# Patient Record
Sex: FEMALE | Race: WHITE
[De-identification: names, ages, dates, MRNs, and addresses within clinical notes are randomized per-mention and may not be internally consistent; named-entity substitution may affect disease eponyms.]

---

## 2017-06-26 ENCOUNTER — HOSPITAL ENCOUNTER (OUTPATIENT)
Dept: HOSPITAL 47 - RADECHMAIN | Age: 82
Discharge: HOME | End: 2017-06-26
Attending: INTERNAL MEDICINE
Payer: MEDICARE

## 2017-06-26 DIAGNOSIS — R00.1: ICD-10-CM

## 2017-06-26 DIAGNOSIS — I44.0: Primary | ICD-10-CM

## 2017-06-26 DIAGNOSIS — R00.0: ICD-10-CM

## 2017-06-26 PROCEDURE — 93225 XTRNL ECG REC<48 HRS REC: CPT

## 2017-06-26 PROCEDURE — 93226 XTRNL ECG REC<48 HR SCAN A/R: CPT

## 2017-09-09 ENCOUNTER — HOSPITAL ENCOUNTER (INPATIENT)
Dept: HOSPITAL 47 - EC | Age: 82
LOS: 2 days | Discharge: HOME | DRG: 312 | End: 2017-09-11
Payer: MEDICARE

## 2017-09-09 VITALS — BODY MASS INDEX: 24.4 KG/M2

## 2017-09-09 DIAGNOSIS — Z80.3: ICD-10-CM

## 2017-09-09 DIAGNOSIS — Z92.21: ICD-10-CM

## 2017-09-09 DIAGNOSIS — I45.10: ICD-10-CM

## 2017-09-09 DIAGNOSIS — E03.9: ICD-10-CM

## 2017-09-09 DIAGNOSIS — Z90.12: ICD-10-CM

## 2017-09-09 DIAGNOSIS — N18.2: ICD-10-CM

## 2017-09-09 DIAGNOSIS — Z79.899: ICD-10-CM

## 2017-09-09 DIAGNOSIS — I67.1: ICD-10-CM

## 2017-09-09 DIAGNOSIS — I95.1: Primary | ICD-10-CM

## 2017-09-09 DIAGNOSIS — Z85.3: ICD-10-CM

## 2017-09-09 DIAGNOSIS — F03.90: ICD-10-CM

## 2017-09-09 DIAGNOSIS — Z80.1: ICD-10-CM

## 2017-09-09 DIAGNOSIS — Z88.6: ICD-10-CM

## 2017-09-09 DIAGNOSIS — Z79.82: ICD-10-CM

## 2017-09-09 DIAGNOSIS — N17.9: ICD-10-CM

## 2017-09-09 DIAGNOSIS — Z90.710: ICD-10-CM

## 2017-09-09 DIAGNOSIS — Z80.0: ICD-10-CM

## 2017-09-09 LAB
ALP SERPL-CCNC: 62 U/L (ref 38–126)
ALT SERPL-CCNC: 41 U/L (ref 9–52)
ANION GAP SERPL CALC-SCNC: 11 MMOL/L
AST SERPL-CCNC: 24 U/L (ref 14–36)
BASOPHILS # BLD AUTO: 0.1 K/UL (ref 0–0.2)
BASOPHILS NFR BLD AUTO: 1 %
BUN SERPL-SCNC: 27 MG/DL (ref 7–17)
CALCIUM SPEC-MCNC: 9.9 MG/DL (ref 8.4–10.2)
CH: 30.5
CHCM: 35.2
CHLORIDE SERPL-SCNC: 102 MMOL/L (ref 98–107)
CK SERPL-CCNC: 30 U/L (ref 30–135)
CO2 SERPL-SCNC: 22 MMOL/L (ref 22–30)
EOSINOPHIL # BLD AUTO: 0.5 K/UL (ref 0–0.7)
EOSINOPHIL NFR BLD AUTO: 7 %
ERYTHROCYTE [DISTWIDTH] IN BLOOD BY AUTOMATED COUNT: 4.69 M/UL (ref 3.8–5.4)
ERYTHROCYTE [DISTWIDTH] IN BLOOD: 12.9 % (ref 11.5–15.5)
GLUCOSE BLD-MCNC: 113 MG/DL (ref 75–99)
GLUCOSE SERPL-MCNC: 115 MG/DL (ref 74–99)
HCT VFR BLD AUTO: 40.8 % (ref 34–46)
HDW: 2.78
HGB BLD-MCNC: 14.4 GM/DL (ref 11.4–16)
LUC NFR BLD AUTO: 4 %
LYMPHOCYTES # SPEC AUTO: 2.8 K/UL (ref 1–4.8)
LYMPHOCYTES NFR SPEC AUTO: 38 %
MAGNESIUM SPEC-SCNC: 2.2 MG/DL (ref 1.6–2.3)
MCH RBC QN AUTO: 30.6 PG (ref 25–35)
MCHC RBC AUTO-ENTMCNC: 35.2 G/DL (ref 31–37)
MCV RBC AUTO: 87 FL (ref 80–100)
MONOCYTES # BLD AUTO: 0.6 K/UL (ref 0–1)
MONOCYTES NFR BLD AUTO: 8 %
NEUTROPHILS # BLD AUTO: 3 K/UL (ref 1.3–7.7)
NEUTROPHILS NFR BLD AUTO: 41 %
NON-AFRICAN AMERICAN GFR(MDRD): 35
PARTICLE COUNT: 8498
PH UR: 5.5 [PH] (ref 5–8)
POTASSIUM SERPL-SCNC: 4.6 MMOL/L (ref 3.5–5.1)
PROT SERPL-MCNC: 7.2 G/DL (ref 6.3–8.2)
RBC UR QL: <1 /HPF (ref 0–5)
SODIUM SERPL-SCNC: 135 MMOL/L (ref 137–145)
SP GR UR: 1.01 (ref 1–1.03)
SQUAMOUS UR QL AUTO: <1 /HPF (ref 0–4)
TROPONIN I SERPL-MCNC: <0.012 NG/ML (ref 0–0.03)
UA BILLING (MACRO VS. MICRO): (no result)
UROBILINOGEN UR QL STRIP: <2 MG/DL (ref ?–2)
WBC # BLD AUTO: 0.3 10*3/UL
WBC # BLD AUTO: 7.3 K/UL (ref 3.8–10.6)
WBC #/AREA URNS HPF: 2 /HPF (ref 0–5)
WBC (PEROX): 7.15

## 2017-09-09 PROCEDURE — 71020: CPT

## 2017-09-09 PROCEDURE — 87077 CULTURE AEROBIC IDENTIFY: CPT

## 2017-09-09 PROCEDURE — 84443 ASSAY THYROID STIM HORMONE: CPT

## 2017-09-09 PROCEDURE — 99285 EMERGENCY DEPT VISIT HI MDM: CPT

## 2017-09-09 PROCEDURE — 81003 URINALYSIS AUTO W/O SCOPE: CPT

## 2017-09-09 PROCEDURE — 84439 ASSAY OF FREE THYROXINE: CPT

## 2017-09-09 PROCEDURE — 82553 CREATINE MB FRACTION: CPT

## 2017-09-09 PROCEDURE — 93306 TTE W/DOPPLER COMPLETE: CPT

## 2017-09-09 PROCEDURE — 70450 CT HEAD/BRAIN W/O DYE: CPT

## 2017-09-09 PROCEDURE — 85025 COMPLETE CBC W/AUTO DIFF WBC: CPT

## 2017-09-09 PROCEDURE — 80048 BASIC METABOLIC PNL TOTAL CA: CPT

## 2017-09-09 PROCEDURE — 84484 ASSAY OF TROPONIN QUANT: CPT

## 2017-09-09 PROCEDURE — 83735 ASSAY OF MAGNESIUM: CPT

## 2017-09-09 PROCEDURE — 81001 URINALYSIS AUTO W/SCOPE: CPT

## 2017-09-09 PROCEDURE — 96360 HYDRATION IV INFUSION INIT: CPT

## 2017-09-09 PROCEDURE — 70498 CT ANGIOGRAPHY NECK: CPT

## 2017-09-09 PROCEDURE — 82550 ASSAY OF CK (CPK): CPT

## 2017-09-09 PROCEDURE — 36415 COLL VENOUS BLD VENIPUNCTURE: CPT

## 2017-09-09 PROCEDURE — 87086 URINE CULTURE/COLONY COUNT: CPT

## 2017-09-09 PROCEDURE — 94760 N-INVAS EAR/PLS OXIMETRY 1: CPT

## 2017-09-09 PROCEDURE — 80053 COMPREHEN METABOLIC PANEL: CPT

## 2017-09-09 PROCEDURE — 93005 ELECTROCARDIOGRAM TRACING: CPT

## 2017-09-09 PROCEDURE — 96361 HYDRATE IV INFUSION ADD-ON: CPT

## 2017-09-09 PROCEDURE — 87186 SC STD MICRODIL/AGAR DIL: CPT

## 2017-09-09 NOTE — CT
EXAMINATION TYPE: CT brain wo con

 

DATE OF EXAM: 9/9/2017

 

COMPARISON: Prior head CT 2/23/2013

 

HISTORY: Dizziness and syncope.

 

CT DLP: 949.40 mGycm

Automated exposure control for dose reduction was used. Helical imaging through the brain.

 

FINDINGS: 

Exam is stable. There is extensive cortical atrophy, cerebral vascular calcifications are present. Pe
riventricular white matter shows patchy low attenuation. There is no hemorrhage or hydrocephalus. The
 calvarium is intact. 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

 

IMPRESSION: 

NO ACUTE ABNORMALITY.

## 2017-09-09 NOTE — XR
EXAMINATION TYPE: XR chest 2V

 

DATE OF EXAM: 9/9/2017

 

COMPARISON: Prior chest x-ray 7/18/2013

 

HISTORY: Cough and weakness

 

TECHNIQUE:  Frontal and lateral views of the chest are obtained.

 

FINDINGS: Technique is apical lordotic and rotated. There is no focal air space opacity, pleural effu
tj, or pneumothorax seen.  The cardiac silhouette size is thought to be stable, rotation may accent
uate the appearance.   The osseous structures are intact.

 

IMPRESSION:  No acute cardiopulmonary process. Difficult to exclude cardiomegaly.

## 2017-09-09 NOTE — CT
EXAM:

  CT Angiography Neck With Intravenous Contrast

 

CLINICAL HISTORY:

  Reason: syncope

 

TECHNIQUE:

  Axial computed tomographic angiography images of the neck with 

intravenous contrast using CT angiography protocol.  This CT exam was 

performed using one or more of the following dose reduction techniques: 

automated exposure control, adjustment of the mA and/or kV according to 

patient size, and/or use of iterative reconstruction technique.

  MIP reconstructed images were created and reviewed.  Dose Length 

Product (DLP) is 228.90 mGy-cm.

 

CONTRAST:

  80 mL of Visipaque 320 administered intravenously.

 

COMPARISON:

  No relevant prior studies available.

 

FINDINGS:

 

 VASCULATURE:

  Right common carotid artery:  Unremarkable.  No significant stenosis.  

No dissection or occlusion.

  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 

the right internal carotid artery located in the petrous segment.  There 

is osseous remodeling of the adjacent right temporal bone.  Large 

atherosclerotic plaques are seen around the aneurysm sac.  Extracranial 

segment is patent with no significant stenosis.  No dissection or 

occlusion.

  Right external carotid artery:  Unremarkable.  No occlusion.

  Right vertebral artery:  Unremarkable.  No significant stenosis.  No 

dissection or occlusion.

 

  Left common carotid artery:  Unremarkable.  No significant stenosis.  

No dissection or occlusion.

  Left internal carotid artery:  Unremarkable.  Extracranial segment is 

patent with no significant stenosis.  No dissection or occlusion.

  Left external carotid artery:  Unremarkable.  No occlusion.

  Left vertebral artery:  Unremarkable.  No significant stenosis.  No 

dissection or occlusion.

 

 NECK:

  Bones/joints:  No acute fracture.  No dislocation.

  Soft tissues:  Unremarkable as visualized.  No mass.

 

 CAROTID STENOSIS REFERENCE USING NASCET CRITERIA:

  % ICA stenosis = (1 - narrowest ICA diameter/diameter of distal 

cervical ICA) x 100.

  Mild - <50% stenosis.

  Moderate - 50-69% stenosis.

  Severe - 70-94% stenosis.

  Near occlusion - 95-99% stenosis.

  Occluded - 100% stenosis.

 

IMPRESSION:     

  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 

artery located in the petrous segment.

## 2017-09-09 NOTE — ED
Syncope HPI





- General


Source: patient, family, RN notes reviewed


Mode of arrival: wheelchair


Limitations: no limitations





- History of Present Illness


MD Complaint: loss of consciousness, felt faint





<Mayur Lindsay - Last Filed: 09/09/17 20:47>





<Brodie Ordonez - Last Filed: 09/09/17 23:02>





- General


Chief Complaint: Dizziness


Stated Complaint: syncope,


Time Seen by Provider: 09/09/17 18:42





- History of Present Illness


Initial Comments: 





This is a 85-year-old female with a history of prior episodes of passing out 

also history of a mastectomy about 5 years ago history of chemo or radiation 

with that who was at [facility].  She got out of the bathroom a.m. possibly 

passed out.  She has no recall of this.  She does not recall any headache 

dizziness blurry vision palpitations or other symptoms she was brought in by 

private vehicle by her  patient is had no symptoms since no complaints 

of focal weakness headache blurry vision.  She did take her medication today 

and an empty stomach.  It is unclear per  of this had anything to do 

with it. (Mayur Lindsay)





- Related Data


 Home Medications











 Medication  Instructions  Recorded  Confirmed


 


Aspirin EC [Ecotrin Low Dose] 81 mg PO DAILY 09/09/17 09/09/17


 


Cyanocobalamin [Vitamin B-12 1,000 mcg SQ WE 09/09/17 09/09/17





Injection]   


 


Cyanocobalamin [Vitamin B-12] 500 mcg PO DAILY 09/09/17 09/09/17


 


Levothyroxine Sodium [Synthroid] 125 mcg PO DAILY 09/09/17 09/09/17


 


Multivitamins, Thera [Multivitamin 1 tab PO DAILY 09/09/17 09/09/17





(formulary)]   











 Allergies











Allergy/AdvReac Type Severity Reaction Status Date / Time


 


meperidine [From Demerol] Allergy  Rash/Hives Verified 09/09/17 19:09














Review of Systems


ROS Other: All systems not noted in ROS Statement are negative.





<Mayur Lindsay - Last Filed: 09/09/17 20:47>


ROS Other: All systems not noted in ROS Statement are negative.





<Brodie Ordonez - Last Filed: 09/09/17 23:02>


ROS Statement: 


Those systems with pertinent positive or pertinent negative responses have been 

documented in the HPI.








Past Medical History


Past Medical History: Thyroid Disorder


History of Any Multi-Drug Resistant Organisms: None Reported


Past Surgical History: Hysterectomy


Additional Past Surgical History / Comment(s): left masectomy


Past Psychological History: No Psychological Hx Reported


Smoking Status: Never smoker


Past Alcohol Use History: None Reported


Past Drug Use History: None Reported





<Mayur Lindsay - Last Filed: 09/09/17 20:47>





General Exam


Limitations: no limitations


General appearance: alert, in no apparent distress


Head exam: Present: atraumatic, normocephalic, normal inspection


Eye exam: Present: normal appearance, PERRL, EOMI.  Absent: scleral icterus, 

conjunctival injection, periorbital swelling


ENT exam: Present: mucous membranes dry


Neck exam: Present: normal inspection.  Absent: tenderness, meningismus, 

lymphadenopathy


Respiratory exam: Present: normal lung sounds bilaterally.  Absent: respiratory 

distress, wheezes, rales, rhonchi, stridor


Cardiovascular Exam: Present: regular rate, normal rhythm, normal heart sounds.

  Absent: systolic murmur, diastolic murmur, rubs, gallop, clicks


GI/Abdominal exam: Present: soft, normal bowel sounds.  Absent: distended, 

tenderness, guarding, rebound, rigid


Extremities exam: Present: normal inspection, full ROM, normal capillary 

refill.  Absent: tenderness, pedal edema, joint swelling, calf tenderness


Back exam: Present: normal inspection


Neurological exam: Present: alert, oriented X3, CN II-XII intact


Psychiatric exam: Present: normal affect, normal mood


Skin exam: Present: warm, dry, intact, normal color.  Absent: rash





<Mayur Lindsay - Last Filed: 09/09/17 20:47>





<Brodie Ordonez - Last Filed: 09/09/17 23:02>





- General Exam Comments


Initial Comments: 





This is a well up well-nourished awake alert oriented 3 female (Mayur Lindsay)





Course





<Mayur Lindsay - Last Filed: 09/09/17 20:47>





<Brodie Ordonez - Last Filed: 09/09/17 23:02>





 Vital Signs











  09/09/17 09/09/17 09/09/17





  18:37 19:40 20:41


 


Temperature 98.2 F  


 


Pulse Rate 69  71


 


Pulse Rate [  62 





Sitting]   


 


Pulse Rate [  68 





Supine Cardiac   





Monitor]   


 


Respiratory 16  18





Rate   


 


Blood Pressure 131/64  152/55


 


Blood Pressure  114/63 





[Right Arm   





Sitting]   


 


Blood Pressure  119/58 





[Right Arm   





Supine]   


 


O2 Sat by Pulse 97  96





Oximetry   














  09/09/17





  22:51


 


Temperature 


 


Pulse Rate 66


 


Pulse Rate [ 





Sitting] 


 


Pulse Rate [ 





Supine Cardiac 





Monitor] 


 


Respiratory 16





Rate 


 


Blood Pressure 142/77


 


Blood Pressure 





[Right Arm 





Sitting] 


 


Blood Pressure 





[Right Arm 





Supine] 


 


O2 Sat by Pulse 96





Oximetry 














- Reevaluation(s)


Reevaluation #1: 





09/09/17 20:47


The patient will be endorsed to Dr. Ordonez who will make about disposition (Mayur Lindsay)





EKG Findings





- EKG Results:


EKG: interpreted by ERMD, sinus rhythm (Sinus rhythm a rate of 67 RI interval 

156  QT since QTC of 426/450 with exodeviation right bundle-branch block 

no acute ST-T wave changes.)





<Mayur Lindsay - Last Filed: 09/09/17 20:47>





Medical Decision Making





- Lab Data


Result diagrams: 


 09/09/17 19:00





 09/09/17 19:00





<Mayur Lindsay - Last Filed: 09/09/17 20:47>





- Lab Data


Result diagrams: 


 09/09/17 19:00





 09/09/17 19:00





- Radiology Data


Radiology results: report reviewed (Computed tomography scan of the brain shows 

no acute process.), image reviewed (Chest x-ray shows no acute process)





<Brodie Ordonez - Last Filed: 09/09/17 23:02>





- Medical Decision Making





Patient reevaluated and resting comfortably in bed.  Patient symptom-free at 

this time.  Patient was at [employer] and got up to use the restroom.  Patient then 

passed out.  Some onset.  Patient does not recall the episode and does not have 

symptoms prior to this.  Case was discussed in detail with Dr. Dunne, who will 

admit for Dr. Carter.  She recommends cardiology consult, no neurology consult 

at this time however would like EEG and CT of the neck.  Patient and family 

were updated. (Brodie Ordonez)





- Lab Data





 Lab Results











  09/09/17 09/09/17 09/09/17 Range/Units





  19:00 19:00 19:00 


 


WBC  7.3    (3.8-10.6)  k/uL


 


RBC  4.69    (3.80-5.40)  m/uL


 


Hgb  14.4    (11.4-16.0)  gm/dL


 


Hct  40.8    (34.0-46.0)  %


 


MCV  87.0    (80.0-100.0)  fL


 


MCH  30.6    (25.0-35.0)  pg


 


MCHC  35.2    (31.0-37.0)  g/dL


 


RDW  12.9    (11.5-15.5)  %


 


Plt Count  306    (150-450)  k/uL


 


Neutrophils %  41    %


 


Lymphocytes %  38    %


 


Monocytes %  8    %


 


Eosinophils %  7    %


 


Basophils %  1    %


 


Neutrophils #  3.0    (1.3-7.7)  k/uL


 


Lymphocytes #  2.8    (1.0-4.8)  k/uL


 


Monocytes #  0.6    (0-1.0)  k/uL


 


Eosinophils #  0.5    (0-0.7)  k/uL


 


Basophils #  0.1    (0-0.2)  k/uL


 


Sodium   135 L   (137-145)  mmol/L


 


Potassium   4.6   (3.5-5.1)  mmol/L


 


Chloride   102   ()  mmol/L


 


Carbon Dioxide   22   (22-30)  mmol/L


 


Anion Gap   11   mmol/L


 


BUN   27 H   (7-17)  mg/dL


 


Creatinine   1.44 H   (0.52-1.04)  mg/dL


 


Est GFR (MDRD) Af Amer   42   (>60 ml/min/1.73 sqM)  


 


Est GFR (MDRD) Non-Af   35   (>60 ml/min/1.73 sqM)  


 


Glucose   115 H   (74-99)  mg/dL


 


POC Glucose (mg/dL)     (75-99)  mg/dL


 


POC Glu Operater ID     


 


Calcium   9.9   (8.4-10.2)  mg/dL


 


Magnesium   2.2   (1.6-2.3)  mg/dL


 


Total Bilirubin   0.4   (0.2-1.3)  mg/dL


 


AST   24   (14-36)  U/L


 


ALT   41   (9-52)  U/L


 


Alkaline Phosphatase   62   ()  U/L


 


Total Creatine Kinase    30  ()  U/L


 


CK-MB (CK-2)    0.3  (0.0-2.4)  ng/mL


 


CK-MB (CK-2) Rel Index    1.0  


 


Troponin I    <0.012  (0.000-0.034)  ng/mL


 


Total Protein   7.2   (6.3-8.2)  g/dL


 


Albumin   4.0   (3.5-5.0)  g/dL


 


Urine Color     


 


Urine Appearance     (Clear)  


 


Urine pH     (5.0-8.0)  


 


Ur Specific Gravity     (1.001-1.035)  


 


Urine Protein     (Negative)  


 


Urine Glucose (UA)     (Negative)  


 


Urine Ketones     (Negative)  


 


Urine Blood     (Negative)  


 


Urine Nitrite     (Negative)  


 


Urine Bilirubin     (Negative)  


 


Urine Urobilinogen     (<2.0)  mg/dL


 


Ur Leukocyte Esterase     (Negative)  


 


Urine RBC     (0-5)  /hpf


 


Urine WBC     (0-5)  /hpf


 


Ur Squamous Epith Cells     (0-4)  /hpf


 


Urine Bacteria     (None)  /hpf


 


Hyaline Casts     (0-2)  /lpf


 


Urine Mucus     (None)  /hpf














  09/09/17 09/09/17 Range/Units





  19:03 21:10 


 


WBC    (3.8-10.6)  k/uL


 


RBC    (3.80-5.40)  m/uL


 


Hgb    (11.4-16.0)  gm/dL


 


Hct    (34.0-46.0)  %


 


MCV    (80.0-100.0)  fL


 


MCH    (25.0-35.0)  pg


 


MCHC    (31.0-37.0)  g/dL


 


RDW    (11.5-15.5)  %


 


Plt Count    (150-450)  k/uL


 


Neutrophils %    %


 


Lymphocytes %    %


 


Monocytes %    %


 


Eosinophils %    %


 


Basophils %    %


 


Neutrophils #    (1.3-7.7)  k/uL


 


Lymphocytes #    (1.0-4.8)  k/uL


 


Monocytes #    (0-1.0)  k/uL


 


Eosinophils #    (0-0.7)  k/uL


 


Basophils #    (0-0.2)  k/uL


 


Sodium    (137-145)  mmol/L


 


Potassium    (3.5-5.1)  mmol/L


 


Chloride    ()  mmol/L


 


Carbon Dioxide    (22-30)  mmol/L


 


Anion Gap    mmol/L


 


BUN    (7-17)  mg/dL


 


Creatinine    (0.52-1.04)  mg/dL


 


Est GFR (MDRD) Af Amer    (>60 ml/min/1.73 sqM)  


 


Est GFR (MDRD) Non-Af    (>60 ml/min/1.73 sqM)  


 


Glucose    (74-99)  mg/dL


 


POC Glucose (mg/dL)  113 H   (75-99)  mg/dL


 


POC Glu Operater ID  McDaid, Salome   


 


Calcium    (8.4-10.2)  mg/dL


 


Magnesium    (1.6-2.3)  mg/dL


 


Total Bilirubin    (0.2-1.3)  mg/dL


 


AST    (14-36)  U/L


 


ALT    (9-52)  U/L


 


Alkaline Phosphatase    ()  U/L


 


Total Creatine Kinase    ()  U/L


 


CK-MB (CK-2)    (0.0-2.4)  ng/mL


 


CK-MB (CK-2) Rel Index    


 


Troponin I    (0.000-0.034)  ng/mL


 


Total Protein    (6.3-8.2)  g/dL


 


Albumin    (3.5-5.0)  g/dL


 


Urine Color   Yellow  


 


Urine Appearance   Clear  (Clear)  


 


Urine pH   5.5  (5.0-8.0)  


 


Ur Specific Gravity   1.014  (1.001-1.035)  


 


Urine Protein   Negative  (Negative)  


 


Urine Glucose (UA)   Negative  (Negative)  


 


Urine Ketones   Trace H  (Negative)  


 


Urine Blood   Negative  (Negative)  


 


Urine Nitrite   Negative  (Negative)  


 


Urine Bilirubin   Negative  (Negative)  


 


Urine Urobilinogen   <2.0  (<2.0)  mg/dL


 


Ur Leukocyte Esterase   Trace H  (Negative)  


 


Urine RBC   <1  (0-5)  /hpf


 


Urine WBC   2  (0-5)  /hpf


 


Ur Squamous Epith Cells   <1  (0-4)  /hpf


 


Urine Bacteria   Occasional H  (None)  /hpf


 


Hyaline Casts   4 H  (0-2)  /lpf


 


Urine Mucus   Rare H  (None)  /hpf














Disposition





<Mayur Lindsay - Last Filed: 09/09/17 20:47>





<Brodie Ordonez - Last Filed: 09/09/17 23:02>


Clinical Impression: 


 Syncope





Disposition: ADMITTED AS IP TO THIS HOSP


Referrals: 


Jarek Carter MD [Primary Care Provider] - 1-2 days

## 2017-09-10 VITALS — RESPIRATION RATE: 18 BRPM

## 2017-09-10 LAB
ANION GAP SERPL CALC-SCNC: 7 MMOL/L
BUN SERPL-SCNC: 20 MG/DL (ref 7–17)
CALCIUM SPEC-MCNC: 9.2 MG/DL (ref 8.4–10.2)
CHLORIDE SERPL-SCNC: 111 MMOL/L (ref 98–107)
CK SERPL-CCNC: 23 U/L (ref 30–135)
CK SERPL-CCNC: 23 U/L (ref 30–135)
CO2 SERPL-SCNC: 17 MMOL/L (ref 22–30)
GLUCOSE SERPL-MCNC: 93 MG/DL (ref 74–99)
GLUCOSE UR QL: (no result)
NON-AFRICAN AMERICAN GFR(MDRD): 53
PH UR: 5.5 [PH] (ref 5–8)
POTASSIUM SERPL-SCNC: 4.3 MMOL/L (ref 3.5–5.1)
SODIUM SERPL-SCNC: 135 MMOL/L (ref 137–145)
SP GR UR: 1.01 (ref 1–1.03)
TROPONIN I SERPL-MCNC: <0.012 NG/ML (ref 0–0.03)
TROPONIN I SERPL-MCNC: <0.012 NG/ML (ref 0–0.03)
UA BILLING (MACRO VS. MICRO): (no result)
UROBILINOGEN UR QL STRIP: <2 MG/DL (ref ?–2)

## 2017-09-10 RX ADMIN — CEFAZOLIN SCH: 330 INJECTION, POWDER, FOR SOLUTION INTRAMUSCULAR; INTRAVENOUS at 00:58

## 2017-09-10 NOTE — P.CRDCN
History of Present Illness


Consult date: 09/10/17


Consult reason: sycope


History of present illness: 


85-year-old lady with history of mild dementia comes to Hospital having had an 

episode of syncope.  Patient was at [facility] yesterday she went to the bathroom 

and was brought back probably having had a syncope  is not quite sure 

not raised the the patient.  There is no bladder bowel incontinence she doesn't 

have chest pain she doesn't have focal neurological deficits EKG shows sinus 

rhythm with right bundle branch block.  Since being admitted she is doing well 

and did not have documented tachycardia or bradycardia arrhythmias.








Review of Systems


Constitutional: Denies chills. Denies fever.


Eyes: Denies blurred vision. Denies pain.


Ears, nose, mouth and throat: Denies headache. Denies sore throat.


Cardiovascular: Denies chest pain. Denies shortness of breath.


Respiratory: Denies cough.


Gastrointestinal: Denies abdominal pain. Denies diarrhea. Denies nausea. Denies 

vomiting.


Musculoskeletal: Denies myalgias.


Integumentary: Denies pruritus. Denies rash.


Neurological: Denies numbness. Denies weakness.  Syncope impaired memory


Psychiatric: Denies anxiety. Denies depression.


Endocrine: Denies fatigue. Denies weight change.


Genitourinary: Denies burning, hematuria, frequency of urination. 


Hematological: No anemia or excess bleeding. 








Past Medical History


Past Medical History: Cancer, Thyroid Disorder


Additional Past Medical History / Comment(s): Breast


History of Any Multi-Drug Resistant Organisms: None Reported


Past Surgical History: Hysterectomy


Additional Past Surgical History / Comment(s): left masectomy


Past Anesthesia/Blood Transfusion Reactions: No Reported Reaction


Past Psychological History: No Psychological Hx Reported


Smoking Status: Never smoker


Past Alcohol Use History: None Reported


Past Drug Use History: None Reported





- Past Family History


  ** Father


Family Medical History: Cancer


Additional Family Medical History / Comment(s): Lung





  ** Mother


Family Medical History: Cancer


Additional Family Medical History / Comment(s): Colon metastasize to liver





  ** Sister(s)


Family Medical History: Cancer


Additional Family Medical History / Comment(s): Breast





Medications and Allergies


 Home Medications











 Medication  Instructions  Recorded  Confirmed  Type


 


Aspirin EC [Ecotrin Low Dose] 81 mg PO DAILY 09/09/17 09/09/17 History


 


Cyanocobalamin [Vitamin B-12 1,000 mcg SQ WE 09/09/17 09/09/17 History





Injection]    


 


Cyanocobalamin [Vitamin B-12] 500 mcg PO DAILY 09/09/17 09/09/17 History


 


Levothyroxine Sodium [Synthroid] 125 mcg PO DAILY 09/09/17 09/09/17 History


 


Multivitamins, Thera [Multivitamin 1 tab PO DAILY 09/09/17 09/09/17 History





(formulary)]    











 Allergies











Allergy/AdvReac Type Severity Reaction Status Date / Time


 


meperidine [From Demerol] Allergy  Rash/Hives Verified 09/09/17 19:09














Physical Exam


Vitals: 


 Vital Signs











  Temp Pulse Pulse Pulse Pulse Resp BP


 


 09/10/17 08:00  96.8 F L   76    18 


 


 09/10/17 07:55       


 


 09/10/17 04:00  97.2 F L   89    20 


 


 09/10/17 00:14  98.6 F   72    18 


 


 09/09/17 22:51   66     16  142/77


 


 09/09/17 20:41   71     18  152/55


 


 09/09/17 19:40     62  68  


 


 09/09/17 18:37  98.2 F  69     16  131/64














  BP BP BP Pulse Ox


 


 09/10/17 08:00    143/64  98


 


 09/10/17 07:55     98


 


 09/10/17 04:00    160/72  95


 


 09/10/17 00:14    137/61  97


 


 09/09/17 22:51     96


 


 09/09/17 20:41     96


 


 09/09/17 19:40  114/63  119/58  


 


 09/09/17 18:37     97








 Intake and Output











 09/09/17 09/10/17 09/10/17





 22:59 06:59 14:59


 


Intake Total  450 180


 


Balance  450 180


 


Intake:   


 


  IV  450 


 


    Sodium Chloride 0.9% 1,  450 





    000 ml @ 75 mls/hr IV .   





    [id number] STA Rx#:[id number]   


 


  Oral   180


 


Other:   


 


  Voiding Method  Toilet 


 


  # Voids  2 


 


  Weight 63.503 kg 64.6 kg 











General:  The patient is awake and alert, in no distress, and does not appear 

acutely ill. 


Skin:  Skin is warm and dry and no rashes or lesions are noted. 


Eye:  Pupils are equal, round and reactive to light, extra-ocular movements are 

intact; there is normal conjunctiva bilaterally.  


Ears, nose, mouth and throat:  There are moist mucous membranes and no oral 

lesions. 


Neck:  The neck is supple, there is no tenderness  or JVD.  


Cardiovascular:  There is a regular rate and rhythm.  Ejection systolic murmur 

in the aortic area.


Respiratory:  Lungs are clear to auscultation, respirations are non-labored, 

breath sounds are equal.  


Gastrointestinal:  Soft, non-distended, non-tender abdomen without masses or 

organomegaly noted. There is no rebound or guarding present. Bowel sounds are 

unremarkable. 


Back:  There is no tenderness to palpation in the midline. There is no obvious 

deformity.


Musculoskeletal:  Normal ROM, no tenderness, There is no pedal edema. There is 

no calf tenderness or swelling.  


Extremities: No edema. 


Vascular: Femoral pulse is normal. Posterior tibial pulses are normal .Dorsalis 

pedis is palpable.


Neurological:  CN II-XII intact. There are no obvious motor or sensory 

deficits. Speech is normal.


Psychiatric:  Cooperative, pleasantly confused.  











Results





 09/09/17 19:00





 09/10/17 05:54


 Cardiac Enzymes











  09/09/17 09/09/17 09/10/17 Range/Units





  19:00 19:00 01:15 


 


AST  24    (14-36)  U/L


 


CK-MB (CK-2)   0.3  0.3  (0.0-2.4)  ng/mL


 


Troponin I   <0.012  <0.012  (0.000-0.034)  ng/mL














  09/10/17 Range/Units





  05:54 


 


AST   (14-36)  U/L


 


CK-MB (CK-2)  0.3  (0.0-2.4)  ng/mL


 


Troponin I  <0.012  (0.000-0.034)  ng/mL








 CBC











  09/09/17 Range/Units





  19:00 


 


WBC  7.3  (3.8-10.6)  k/uL


 


RBC  4.69  (3.80-5.40)  m/uL


 


Hgb  14.4  (11.4-16.0)  gm/dL


 


Hct  40.8  (34.0-46.0)  %


 


Plt Count  306  (150-450)  k/uL








 Comprehensive Metabolic Panel











  09/09/17 09/10/17 Range/Units





  19:00 05:54 


 


Sodium  135 L  135 L  (137-145)  mmol/L


 


Potassium  4.6  4.3  (3.5-5.1)  mmol/L


 


Chloride  102  111 H  ()  mmol/L


 


Carbon Dioxide  22  17 L  (22-30)  mmol/L


 


BUN  27 H  20 H  (7-17)  mg/dL


 


Creatinine  1.44 H  1.00  (0.52-1.04)  mg/dL


 


Glucose  115 H  93  (74-99)  mg/dL


 


Calcium  9.9  9.2  (8.4-10.2)  mg/dL


 


AST  24   (14-36)  U/L


 


ALT  41   (9-52)  U/L


 


Alkaline Phosphatase  62   ()  U/L


 


Total Protein  7.2   (6.3-8.2)  g/dL


 


Albumin  4.0   (3.5-5.0)  g/dL








 Current Medications











Generic Name Dose Route Start Last Admin





  Trade Name Freq  PRN Reason Stop Dose Admin


 


Sodium Chloride  1,000 mls @ 20 mls/hr  09/09/17 23:15  09/10/17 00:58





  Saline 0.9%  IV   Not Given





  .Q24H FAUSTO   


 


Miscellaneous Information  1 each  09/09/17 23:04  





  Rx Info: Iv Contrast Was Given  MISCELLANE  09/11/17 23:04  





  DAILY PRN   





  Per Protocol   


 


Naloxone HCl  0.2 mg  09/09/17 23:03  





  Narcan  IV   





  Q2M PRN   





  Opioid Reversal   








 Intake and Output











 09/09/17 09/10/17 09/10/17





 22:59 06:59 14:59


 


Intake Total  450 180


 


Balance  450 180


 


Intake:   


 


  IV  450 


 


    Sodium Chloride 0.9% 1,  450 





    000 ml @ 75 mls/hr IV .   





    [id number] STA Rx#:[id number]   


 


  Oral   180


 


Other:   


 


  Voiding Method  Toilet 


 


  # Voids  2 


 


  Weight 63.503 kg 64.6 kg 








 





 09/09/17 19:00 





 09/10/17 05:54 











EKG Interpretations (text)


Normal sinus rhythm with right bundle branch block and left axis deviation








Assessment and Plan


Plan: 


Syncope rule out cardiac causes








So far cardiac workup is negative.  Patient did not have cardiac arrhythmia or 

bradycardia.  Myocardial infarction is ruled out.  She appears stable 

hemodynamically.  I'm going to obtain orthostatics on her.  I will obtain a 2-D 

echo and carotid duplex if they have not been done.  We certainly have to 

consider bradycardia as an etiology for her symptomatology and we will consider 

outpatient Holter or event monitor and if necessary a loop recorder 

implantation.

## 2017-09-10 NOTE — P.HPIM
History of Present Illness


H&P Date: 09/10/17


Chief Complaint: Syncope





This is an 85-year-old pleasant lady patient of Dr. Robert Sacks. She has 

underlying history of hypothyroidism, neurocognitive degenerative disease of 

the brain, with significant memory loss, left breast cancer with mastectomy in 

the past admitted to the hospital secondary to acute episode of syncope. 

Patient was in [employer] heading to the bathroom, the next thing the  knows 

was patient was being helped out in the hallway, with cold sweats and was 

unresponsive. Two-person carrying her at the time,  does not recollect 

any information was passed on by the [employer] [facility] rescue people. No CPR was provided 

or was needed, and was subsequently sent to emergency room for evaluation. 

Patient had multiple episodes of syncope in the past, this would be her fifth 

one in her entire life, her last seizure besides this one was in wintertime in 

[place], she also had been seeing Dr. Gutierrez for her memory loss patient 

cannot provide any meaningful memory as to the events preceding to the syncope. 

Patient denies any trauma no chest pain. Patient was evaluated in the past to 

include an MRI in the brain, and a 48 hour Holter monitor performed June 26, 2017 by Dr. Sacks for which there is no pauses noted, minimum heart rate was 41

, 45% of recordings are bradycardia there is no QT prolongation rare PVCs 

bigeminy and trigeminy noted on the 48 hour Holter monitor. Patient denies any 

diarrhea no new medication changes no diplopia no motor deficits, including 

speech.











Review of Systems


Constitutional: Reports as per HPI, Denies anorexia, Denies chills, Denies 

chronic headaches, Denies chronic pain, Denies daytime sleepiness, Denies 

fatigue, Denies fever, Denies lethargy, Denies malaise, Denies night sweats, 

Denies poor appetite, Denies sweats, Denies weakness, Denies weight gain, 

Denies weight loss


Ears, nose, mouth and throat: Reports as per HPI, Denies ant. neck pain, Denies 

bleeding gums, Denies dental pain, Denies dysphagia, Denies epistaxis, Denies 

headache, Denies hoarseness, Denies mouth pain, Denies nasal congestion, Denies 

nasal discharge, Denies neck fullness/pressure, Denies neck lump, Denies nose 

pain, Denies odynophagia, Denies post-nasal drip, Denies sinus pain, Denies 

sinus pressure, Denies swelling in mouth, Denies swelling in throat, Denies 

sore throat, Denies vertigo, Denies voice changes


Cardiovascular: Reports as per HPI, Reports syncope, Denies chest pain, Denies 

claudication, Denies decreased exercise tolerance, Denies dyspnea on exertion, 

Denies edema, Denies high blood pressure, Denies irregular heart beat, Denies 

leg edema, Denies lightheadedness, Denies orthopnea, Denies palpitations, 

Denies paroxysmal nocturnal dyspnea, Denies phlebitis, Denies rapid heart beat, 

Denies shortness of breath


Respiratory: Reports as per HPI, Denies congestion, Denies cough, Denies cough 

with sputum, Denies dyspnea, Denies excessive sputum, Denies hemoptysis, Denies 

home oxygen, Denies pain, Denies pain on inspiration, Denies pleurisy, Denies 

respiratory infections, Denies sleep apnea, Denies snoring, Denies wheezing


Gastrointestinal: Reports as per HPI, Denies abdominal pain, Denies belching, 

Denies bloating, Denies BRBPR, Denies change in bowel habits, Denies coffee 

ground emesis, Denies constipation, Denies diarrhea, Denies dyspepsia, Denies 

early satiety, Denies excessive gas, Denies heartburn, Denies hematemesis, 

Denies hematochezia, Denies indigestion, Denies jaundice, Denies lactose 

intolerance, Denies loss of appetite, Denies melena, Denies nausea, Denies 

vomiting


Genitourinary: Reports as per HPI, Denies abnormal vaginal bleeding, Denies 

decreased libido, Denies difficulty conceiving, Denies difficulty voiding, 

Denies dysmenorrhea, Denies dyspareunia, Denies dysuria, Denies flank pain, 

Denies genital sores, Denies hematuria, Denies hot flashes, Denies incomplete 

emptying, Denies kidney stones, Denies menorrhagia, Denies mixed incontinence, 

Denies nocturia, Denies pelvic pain, Denies post void dribbling, Denies pregnant

, Denies prolapse symptoms, Denies stress incontinence, Denies urge incontinence

, Denies urgency, Denies urinary frequency, Denies vaginal discharge, Denies 

vaginal dryness, Denies vaginal itching, Denies vaginal odor


Menstruation: Reports as per HPI, Denies amenorrhea, Denies amenorrhea on BC, 

Denies currently menstrual, Denies cycle < 21 days, Denies cycle > 35 days, 

Denies cycle variable, Denies menses 1-7 days, Denies menses 8 or > days, 

Denies menses variable, Denies period heavy, Denies period light, Denies period 

normal, Denies period spotting, Denies post hysterectomy, Denies postmenopausal

, Denies premenarcheal


Musculoskeletal: Reports as per HPI, Denies arm numbness/tingling, Denies 

atrophy, Denies fractures, Denies frequent falls, Denies gait dysfunction, 

Denies hot joints, Denies leg numbness/tingling, Denies limitation of motion, 

Denies loss of height, Denies low back pain, Denies morning stiffness, Denies 

muscle cramps, Denies muscle weakness, Denies myalgias, Denies neck pain, 

Denies neck stiffness, Denies prior amputations, Denies redness of joints, 

Denies shooting arm pain, Denies shooting leg pain


Integumentary: Reports as per HPI, Denies acne, Denies boils, Denies brittle 

nails, Denies change in hair/nails, Denies color changes, Denies darkening of 

skin, Denies depigmentation, Denies dryness, Denies foot/leg ulcers, Denies 

growths, Denies hirsutism, Denies lesions, Denies onychomycosis, Denies pruritus

, Denies rash, Denies sores, Denies striae, Denies unusual bruising, Denies 

wounds


Neurological: Reports as per HPI, Reports syncope, Denies aphasia, Denies ataxia

, Denies balance difficulties, Denies burning pain, Denies change in mentation, 

Denies change in smell/taste, Denies change in speech, Denies confusion, Denies 

convulsions, Denies double vision, Denies gait dysfunction, Denies head injury, 

Denies headaches, Denies hearing difficulties, Denies lack of coordination, 

Denies loss of vision, Denies memory loss, Denies migraines, Denies motor 

disturbance, Denies numbness, Denies paralysis, Denies paresthesias, Denies 

seizures, Denies sensory deficit, Denies spasticity, Denies tic, Denies tingling

, Denies transient paralysis, Denies tremors, Denies vertigo, Denies weakness, 

Denies visual changes


Psychiatric: Reports as per HPI, Denies anhedonia, Denies anxiety, Denies 

anxiety attacks, Denies change in appetite, Denies change in libido, Denies 

change in sleep habits, Denies confusion, Denies depression, Denies difficulty 

concentrating, Denies disorientation, Denies hallucinations, Denies hopelessness

, Denies hypersomnia, Denies insomnia, Denies irritability, Denies memory loss, 

Denies mood swings, Denies paranoia, Denies sadness/tearfulness, Denies sleep 

disturbances, Denies suicidal ideation


Endocrine: Reports as per HPI, Denies cold intolerance, Denies deepening of the 

voice, Denies excessive sweating, Denies excessive thirst, Denies fatigue, 

Denies flushing, Denies heat intolerance, Denies high blood sugars, Denies 

increase in ring/shoe/hat size, Denies low blood sugars, Denies nocturia, 

Denies palpitations, Denies polydipsia, Denies polyphagia, Denies polyuria, 

Denies proptosis, Denies recent glucocorticoid use, Denies thyroid mass, Denies 

weight change


Hematologic/Lymphatic: Reports as per HPI, Denies easy bleeding, Denies easy 

bruising, Denies lymphadenopathy, Denies lymphedema, Denies thrombophilia


Allergic/Immunologic: Reports as per HPI, Denies allergic rhinitis, Denies 

anaphylaxis, Denies angioedema, Denies gluten intolerance, Denies persistent 

infections, Denies seasonal allergies, Denies urticaria, Denies wheezing





Past Medical History


Past Medical History: Cancer, Thyroid Disorder


Additional Past Medical History / Comment(s): Breast


History of Any Multi-Drug Resistant Organisms: None Reported


Past Surgical History: Hysterectomy


Additional Past Surgical History / Comment(s): left masectomy


Past Anesthesia/Blood Transfusion Reactions: No Reported Reaction


Past Psychological History: No Psychological Hx Reported


Smoking Status: Never smoker


Past Alcohol Use History: None Reported


Past Drug Use History: None Reported





- Past Family History


  ** Father


Family Medical History: Cancer (Lung)


Additional Family Medical History / Comment(s): Lung





  ** Mother


Family Medical History: Cancer (Breast)


Additional Family Medical History / Comment(s): Colon metastasize to liver





  ** Sister(s)


Family Medical History: Cancer


Additional Family Medical History / Comment(s): Breast





Medications and Allergies


 Home Medications











 Medication  Instructions  Recorded  Confirmed  Type


 


Aspirin EC [Ecotrin Low Dose] 81 mg PO DAILY 09/09/17 09/09/17 History


 


Cyanocobalamin [Vitamin B-12 1,000 mcg SQ WE 09/09/17 09/09/17 History





Injection]    


 


Cyanocobalamin [Vitamin B-12] 500 mcg PO DAILY 09/09/17 09/09/17 History


 


Levothyroxine Sodium [Synthroid] 125 mcg PO DAILY 09/09/17 09/09/17 History


 


Multivitamins, Thera [Multivitamin 1 tab PO DAILY 09/09/17 09/09/17 History





(formulary)]    











 Allergies











Allergy/AdvReac Type Severity Reaction Status Date / Time


 


meperidine [From Demerol] Allergy  Rash/Hives Verified 09/09/17 19:09














Physical Exam


Vitals: 


 Vital Signs











  Temp Pulse Pulse Pulse Pulse Resp BP


 


 09/10/17 08:00  96.8 F L   76    18 


 


 09/10/17 07:55       


 


 09/10/17 04:00  97.2 F L   89    20 


 


 09/10/17 00:14  98.6 F   72    18 


 


 09/09/17 22:51   66     16  142/77


 


 09/09/17 20:41   71     18  152/55


 


 09/09/17 19:40     62  68  


 


 09/09/17 18:37  98.2 F  69     16  131/64














  BP BP BP Pulse Ox


 


 09/10/17 08:00    143/64  98


 


 09/10/17 07:55     98


 


 09/10/17 04:00    160/72  95


 


 09/10/17 00:14    137/61  97


 


 09/09/17 22:51     96


 


 09/09/17 20:41     96


 


 09/09/17 19:40  114/63  119/58  


 


 09/09/17 18:37     97








 Intake and Output











 09/09/17 09/10/17 09/10/17





 22:59 06:59 14:59


 


Intake Total  450 


 


Balance  450 


 


Intake:   


 


  IV  450 


 


    Sodium Chloride 0.9% 1,  450 





    000 ml @ 75 mls/hr IV .   





    [id number] STA Rx#:[id number]   


 


Other:   


 


  Voiding Method  Toilet 


 


  # Voids  2 


 


  Weight 63.503 kg 64.6 kg 














- Constitutional


General appearance: average body habitus, cooperative, no acute distress





- EENT


Eyes: anicteric sclerae, EOMI, PERRLA, dentition normal


ENT: NA/AT, normal oropharynx





- Neck


Neck: no lymphadenopathy, normal ROM, no other, no rigidity, no stridor, no 

thyromegaly





- Respiratory


Respiratory: bilateral: CTA, negative: diminished, dullness, rales, rhonchi, 

wheezing





- Cardiovascular


Rhythm: regular


Heart sounds: normal: S1, S2


Abnormal Heart Sounds: no systolic murmur, no diastolic murmur, no rub, no S3 

Gallop, no S4 Gallop, no click, no other





- Gastrointestinal


General gastrointestinal: normal bowel sounds, soft





- Integumentary


Integumentary: decreased turgor, normal





- Neurologic


Neurologic: CNII-XII intact





- Musculoskeletal


Musculoskeletal: gait normal, strength equal bilaterally





- Psychiatric


Psychiatric: A&O x's 3 (Significant short-term memory loss), appropriate affect

, intact judgment & insight





Results


CBC & Chem 7: 


 09/09/17 19:00





 09/10/17 05:54


Labs: 


 Abnormal Lab Results - Last 24 Hours (Table)











  09/09/17 09/09/17 09/09/17 Range/Units





  19:00 19:03 21:10 


 


Sodium  135 L    (137-145)  mmol/L


 


Chloride     ()  mmol/L


 


Carbon Dioxide     (22-30)  mmol/L


 


BUN  27 H    (7-17)  mg/dL


 


Creatinine  1.44 H    (0.52-1.04)  mg/dL


 


Glucose  115 H    (74-99)  mg/dL


 


POC Glucose (mg/dL)   113 H   (75-99)  mg/dL


 


Total Creatine Kinase     ()  U/L


 


Urine Ketones    Trace H  (Negative)  


 


Ur Leukocyte Esterase    Trace H  (Negative)  


 


Urine Bacteria    Occasional H  (None)  /hpf


 


Hyaline Casts    4 H  (0-2)  /lpf


 


Urine Mucus    Rare H  (None)  /hpf














  09/10/17 09/10/17 09/10/17 Range/Units





  01:15 05:54 05:54 


 


Sodium    135 L  (137-145)  mmol/L


 


Chloride    111 H  ()  mmol/L


 


Carbon Dioxide    17 L  (22-30)  mmol/L


 


BUN    20 H  (7-17)  mg/dL


 


Creatinine     (0.52-1.04)  mg/dL


 


Glucose     (74-99)  mg/dL


 


POC Glucose (mg/dL)     (75-99)  mg/dL


 


Total Creatine Kinase  23 L  23 L   ()  U/L


 


Urine Ketones     (Negative)  


 


Ur Leukocyte Esterase     (Negative)  


 


Urine Bacteria     (None)  /hpf


 


Hyaline Casts     (0-2)  /lpf


 


Urine Mucus     (None)  /hpf








 Laboratory Results











WBC  7.3 k/uL (3.8-10.6)   09/09/17  19:00    


 


RBC  4.69 m/uL (3.80-5.40)   09/09/17  19:00    


 


Hgb  14.4 gm/dL (11.4-16.0)   09/09/17  19:00    


 


Hct  40.8 % (34.0-46.0)   09/09/17  19:00    


 


MCV  87.0 fL (80.0-100.0)   09/09/17  19:00    


 


MCH  30.6 pg (25.0-35.0)   09/09/17  19:00    


 


MCHC  35.2 g/dL (31.0-37.0)   09/09/17  19:00    


 


RDW  12.9 % (11.5-15.5)   09/09/17  19:00    


 


Plt Count  306 k/uL (150-450)   09/09/17  19:00    


 


Neutrophils %  41 %  09/09/17  19:00    


 


Lymphocytes %  38 %  09/09/17  19:00    


 


Monocytes %  8 %  09/09/17  19:00    


 


Eosinophils %  7 %  09/09/17  19:00    


 


Basophils %  1 %  09/09/17  19:00    


 


Neutrophils #  3.0 k/uL (1.3-7.7)   09/09/17  19:00    


 


Lymphocytes #  2.8 k/uL (1.0-4.8)   09/09/17  19:00    


 


Monocytes #  0.6 k/uL (0-1.0)   09/09/17  19:00    


 


Eosinophils #  0.5 k/uL (0-0.7)   09/09/17  19:00    


 


Basophils #  0.1 k/uL (0-0.2)   09/09/17  19:00    


 


Sodium  135 mmol/L (137-145)  L  09/10/17  05:54    


 


Potassium  4.3 mmol/L (3.5-5.1)   09/10/17  05:54    


 


Chloride  111 mmol/L ()  H  09/10/17  05:54    


 


Carbon Dioxide  17 mmol/L (22-30)  L  09/10/17  05:54    


 


Anion Gap  7 mmol/L  09/10/17  05:54    


 


BUN  20 mg/dL (7-17)  H  09/10/17  05:54    


 


Creatinine  1.00 mg/dL (0.52-1.04)   09/10/17  05:54    


 


Est GFR (MDRD) Af Amer  >60  (>60 ml/min/1.73 sqM)   09/10/17  05:54    


 


Est GFR (MDRD) Non-Af  53  (>60 ml/min/1.73 sqM)   09/10/17  05:54    


 


Glucose  93 mg/dL (74-99)   09/10/17  05:54    


 


POC Glucose (mg/dL)  113 mg/dL (75-99)  H  09/09/17  19:03    


 


POC Glu Operater ID  [place]   09/09/17  19:03    


 


Calcium  9.2 mg/dL (8.4-10.2)   09/10/17  05:54    


 


Magnesium  2.2 mg/dL (1.6-2.3)   09/09/17  19:00    


 


Total Bilirubin  0.4 mg/dL (0.2-1.3)   09/09/17  19:00    


 


AST  24 U/L (14-36)   09/09/17  19:00    


 


ALT  41 U/L (9-52)   09/09/17  19:00    


 


Alkaline Phosphatase  62 U/L ()   09/09/17  19:00    


 


Total Creatine Kinase  23 U/L ()  L  09/10/17  05:54    


 


CK-MB (CK-2)  0.3 ng/mL (0.0-2.4)   09/10/17  05:54    


 


CK-MB (CK-2) Rel Index  1.3   09/10/17  05:54    


 


Troponin I  <0.012 ng/mL (0.000-0.034)   09/10/17  05:54    


 


Total Protein  7.2 g/dL (6.3-8.2)   09/09/17  19:00    


 


Albumin  4.0 g/dL (3.5-5.0)   09/09/17  19:00    


 


Urine Color  Light Yellow   09/10/17  10:05    


 


Urine Appearance  Clear  (Clear)   09/10/17  10:05    


 


Urine pH  5.5  (5.0-8.0)   09/10/17  10:05    


 


Ur Specific Gravity  1.015  (1.001-1.035)   09/10/17  10:05    


 


Urine Protein  Negative  (Negative)   09/10/17  10:05    


 


Urine Glucose (UA)  2+  (Negative)  H  09/10/17  10:05    


 


Urine Ketones  Negative  (Negative)   09/10/17  10:05    


 


Urine Blood  Negative  (Negative)   09/10/17  10:05    


 


Urine Nitrite  Negative  (Negative)   09/10/17  10:05    


 


Urine Bilirubin  Negative  (Negative)   09/10/17  10:05    


 


Urine Urobilinogen  <2.0 mg/dL (<2.0)   09/10/17  10:05    


 


Ur Leukocyte Esterase  Negative  (Negative)   09/10/17  10:05    


 


Urine RBC  <1 /hpf (0-5)   09/09/17  21:10    


 


Urine WBC  2 /hpf (0-5)   09/09/17  21:10    


 


Ur Squamous Epith Cells  <1 /hpf (0-4)   09/09/17  21:10    


 


Urine Bacteria  Occasional /hpf (None)  H  09/09/17  21:10    


 


Hyaline Casts  4 /lpf (0-2)  H  09/09/17  21:10    


 


Urine Mucus  Rare /hpf (None)  H  09/09/17  21:10    














Thrombosis Risk Factor Assmnt





- DVT/VTE Prophylaxis


DVT/VTE Prophylaxis: Mechanical Prophylaxis ordered, Low risk, early ambulation 

encouraged





- Choose All That Apply


Any of the Below Risk Factors Present?: No


Other Risk Factors: Yes


Each Risk Factor Represents 3 Points: Age 75 years or older


Thrombosis Risk Factor Assessment Total Risk Factor Score: 3


Thrombosis Risk Factor Assessment Level: Moderate Risk





Assessment and Plan


Plan: 





1. Recurrent syncope, this would be her fifth episode her entire life, patient 

was seen consultation by cardiology, she follows with Dr. Wong outpatient for 

her neurology needs, EEG of the brain would be requested, CTA of the neck shows 

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 

incidental finding rather than direct consequence of her syncope, patient would 

need orthostatic vital signs here outpatient tilt table test, she had a recent 

2 day Holter monitor performed June 26, 2017 which showed 41% of documented 

heart rhythm is in sinus bradycardia, lowest HR 41, no pauses over 2.5 seconds, 

rare episodes of bigeminy and trigeminy and PVCs. Patient patient is in 

selective with telemetry monitoring. Patient will be seen by cardiology, most 

likely would need  a loop recorder for a 30 day event monitor, tilt table test.





2. Left breast cancer status post mastectomy the current treatments at this time





3. Hypothyroidism on thyroid supplementation





4. 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 

the petrous bone, Dr. Méndez was consulted. CTA failed to reveal any 

significant and internal carotid stenosis





5. Neurocognitive degenerative disease of the brain, follows with Dr. Wong for 

outpatient, patient is receiving B12 shots as often as every week, patient's 

off anticholinesterase medications however this agents can also potentiate 

bradycardia and arrhythmias specially with the use of Aricept, unsure whether 

the patient had been on an NMDA receptor antagonist such as Namenda





6. Acute renal failure with CK D stage II, continue IV hydration, urine 

negative for infection, no ongoing GI losses identified at this time





6 GI prophylaxis





DVT prophylaxis

## 2017-09-10 NOTE — CONS
DATE OF CONSULTATION: 09/10/2017



This is 85-year-old female, she has been admitted to [facility] with a 
history of

passing-out spells.  This happened at home.  The patient had no history of loss 
of

vision or any motor deficits.  No history of headache or blurry vision.  The 
patient

had a stroke workup and cardiac workup.  CT scan of the carotid shows there is 
a 1.3 cm

at the petrous portion of the brain were obtained.  There is no hemodynamically

significant stenoses are noted.  The patient also had a cardiac workup and 
patient is

scheduled to have a echocardiogram and a cardiac work up.



EXAMINATION:

Patient was seen in her room.  NECK:  Supple.  No bruit appreciated.  CHEST:  
Clear to

auscultation.  First and second sounds are normal.  Vascular exam: Brachy 
radial and

femoral pulses present. Central nervous system:  The patient has normal motor 
function

upper and lower extremity.



PLAN:

We will discuss with Internal Medicine and with the family of the patient.  The 
patient

has a 1.2 cm of the internal carotid, the petrous portion of the brain and if 
family

agrees we will consider having a opinion from the interventionist  neurologist 
if

patient needs any kind of just stent placement for this aneurysm.  At this point
, there

is no role of surgical indication for the internal carotid artery.  Most likely 
if

patient agrees would benefit from internal carotid artery stent placement.  When

patient is stable enough, we will follow in my office  and then we will make

arrangements to tertiary center.



Thank you very much.





KALIA / BROCKN: [id number] / Job#: [id number]

MTDD

## 2017-09-11 VITALS — SYSTOLIC BLOOD PRESSURE: 131 MMHG | HEART RATE: 85 BPM | DIASTOLIC BLOOD PRESSURE: 60 MMHG

## 2017-09-11 VITALS — TEMPERATURE: 97.9 F

## 2017-09-11 RX ADMIN — CEFAZOLIN SCH: 330 INJECTION, POWDER, FOR SOLUTION INTRAMUSCULAR; INTRAVENOUS at 06:56

## 2017-09-11 NOTE — ECHOF
Referral Reason:Heart function



MEASUREMENTS

--------

HEIGHT: 162.6 cm

WEIGHT: 64.4 kg

BP: 137/67

RVIDd:   2.5 cm     (< 3.3)

IVSd:   1.2 cm     (0.6 - 1.1)

LVIDd:   3.9 cm     (3.9 - 5.3)

LVPWd:   0.8 cm     (0.6 - 1.1)

IVSs:   1.4 cm

LVIDs:   3.1 cm

LVPWs:   1.1 cm

LA Diam:   2.8 cm     (2.7 - 3.8)

LAESV Index (A-L):   16.76 ml/m

Ao Diam:   3.0 cm     (2.0 - 3.7)

AV Cusp:   1.5 cm     (1.5 - 2.6)

MV EXCURSION:   9.306 mm     (> 18.000)

MV EF SLOPE:   38 mm/s     (70 - 150)

EPSS:   0.7 cm

MV E Shashi:   0.68 m/s

MV DecT:   284 ms

MV A Shashi:   1.24 m/s

MV E/A Ratio:   0.55 

RAP:   5.00 mmHg

RVSP:   14.73 mmHg







FINDINGS

--------

Sinus rhythm.

This was a technically adequate study.

The left ventricular size is normal.   There is mild 

concentric left ventricular hypertrophy.   Overall left 

ventricular systolic function is normal with, an EF 

between 55 - 60 %.

The right ventricle is normal in size.

Normal LA  size by volume 22+/-6 ml/m2.

The right atrial size is normal.

There is mild aortic valve sclerosis.   There is no 

evidence of aortic regurgitation.

Mild mitral annular calcification present.   Mild 

mitral regurgitation is present.

Mild tricuspid regurgitation present.   There is no 

evidence of pulmonary hypertension.   The right 

ventricular systolic pressure, as measured by Doppler, 

is 14.73mmHg.

There is no pulmonic regurgitation present.

The aortic root size is normal.

Echo free space may represent effusion or a pericardial 

fat pad.



CONCLUSIONS

--------

1. The left ventricular size is normal.

2. There is no pulmonic regurgitation present.

3. The aortic root size is normal.

4. Echo free space may represent effusion or a pericardial 

fat pad.

5. There is mild concentric left ventricular hypertrophy.

6. Overall left ventricular systolic function is normal 

with, an EF between 55 - 60 %.

7. There is mild aortic valve sclerosis.

8. Mild mitral annular calcification present.

9. Mild mitral regurgitation is present.

10. Mild tricuspid regurgitation present.

11. There is no evidence of pulmonary hypertension.

12. The right ventricular systolic pressure, as measured by 

Doppler, is 14.73mmHg.





SONOGRAPHER: Adelaide Anguiano RDCS

## 2017-09-11 NOTE — P.PN
Subjective


Principal diagnosis: 


Syncope





This is a pleasant 85-year-old lady with history of mild dementia.  Percentage 

the hospital with episode of syncope while at [employer] and walking to the 

bathroom.  EKG on admission shows sinus rhythm with right bundle-branch block.  

Since being admitted she has been doing fairly well.  She has had no documented 

tachycardia or bradycardia.  In reviewing vital signs, patient did have some 

significant drop in blood pressure upon standing with a supine blood pressure 

of 163/71, blood pressure while sitting of 143/67 and a standing blood pressure 

of 135/67.  Laboratory values showed a TSH of less than 0.015 and a T4 of 2.79, 

patient is on Synthroid.  Echocardiogram has been obtained we're awaiting 

results at this time.








Objective





- Vital Signs


Vital signs: 


 Vital Signs











Temp  97.9 F   09/11/17 08:00


 


Pulse  68   09/11/17 08:00


 


Resp  18   09/11/17 08:00


 


BP  131/60   09/11/17 08:00


 


Pulse Ox  97   09/11/17 08:00








 Intake & Output











 09/10/17 09/11/17 09/11/17





 18:59 06:59 18:59


 


Intake Total 330  180


 


Output Total 250  


 


Balance 80  180


 


Intake:   


 


  Oral 330  180


 


Output:   


 


  Urine 250  


 


Other:   


 


  Voiding Method  Toilet Toilet


 


  # Voids 3 0 














- Exam


PHYSICAL EXAMINATION: 





HEENT: Head is atraumatic, normocephalic.  Pupils equal, round.  Neck is 

supple.  There is no elevated jugular venous pressure.





HEART EXAMINATION: Heart sounds regular, S1 and S2 normal with a systolic 

murmur at the base.





CHEST EXAMINATION: Lungs are clear to auscultation and precussion. No chest 

wall tenderness is noted on palpation or with deep breathing.





ABDOMEN:  Soft, nontender. Bowel sounds are heard. No organomegaly noted.


 


EXTREMITIES: 2+ peripheral pulses with no evidence of peripheral edema and no 

calf tenderness noted.





NEUROLOGIC patient is awake, alert and pleasantly confused.


 


.


 











- Labs


CBC & Chem 7: 


 09/09/17 19:00





 09/10/17 05:54


Labs: 


 Abnormal Lab Results - Last 24 Hours (Table)











  09/10/17 Range/Units





  05:54 


 


TSH  <0.015 L  (0.465-4.680)  mIU/L


 


Free T4  2.79 H  (0.78-2.19)  ng/dL








 Microbiology - Last 24 Hours (Table)











 09/10/17 10:05 Urine Culture - Preliminary





 Urine,Clean Catch 














Assessment and Plan


Plan: 


Assessment and plan





#1 syncope, likely related to orthostatic hypotension


#2 dementia





From cardiology's perspective, patient was instructed to raise from a sitting 

position slowly and stand for short time prior to beginning to walk to minimize 

side effects of orthostatic hypotension.  She was recommended to stay well 

hydrated.  We will review the 2-D echo with Doppler when that is available and 

if this is okay, plan for Follow-up as an outpatient.





The above dictated assessment and findings were discussed with signing 

physician. The impression and plan of care have been directed as dictated. 

Ester Chambers, Nurse Practitioner, acting as scribe for signing physician.

## 2017-09-11 NOTE — P.PN
Subjective


This is an 85-year-old pleasant lady patient of Dr. Robert Sacks. She has 

underlying history of hypothyroidism, neurocognitive degenerative disease of 

the brain, with significant memory loss, left breast cancer with mastectomy in 

the past admitted to the hospital secondary to acute episode of syncope. 

Patient was in [employer] heading to the bathroom, the next thing the  knows 

was patient was being helped out in the hallway, with cold sweats and was 

unresponsive. Two-person carrying her at the time,  does not recollect 

any information was passed on by the stool rescue people. No CPR was provided 

or was needed, and was subsequently sent to emergency room for evaluation. 

Patient had multiple episodes of syncope in the past, this would be her fifth 

one in her entire life, her last seizure besides this one was in wintertime in 

[place], she also had been seeing Dr. Gutierrez for her memory loss patient 

cannot provide any meaningful memory as to the events preceding to the syncope. 

Patient denies any trauma no chest pain. Patient was evaluated in the past to 

include an MRI in the brain, and a 48 hour Holter monitor performed June 26, 2017 by Dr. Sacks for which there is no pauses noted, minimum heart rate was 41

, 45% of recordings are bradycardia there is no QT prolongation rare PVCs 

bigeminy and trigeminy noted on the 48 hour Holter monitor. Patient denies any 

diarrhea no new medication changes no diplopia no motor deficits, including 

speech.





9/11: The patient was seen and evaluated today.  The patient had a head CT did 

not show any acute abnormality, there is extensive cortical atrophy and 

cerebral vascular calcifications.  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 

segment. Vascular is on consult. So far cardiology workup is negative, patient 

did not have any cardiac arrhythmia, or bradycardia and myocardial infarction 

is ruled out, cardiology is on consult.  Patient is scheduled for EEG and echo 

today, pending results.








Objective





- Vital Signs


Vital signs: 


 Vital Signs











Temp  97.9 F   09/11/17 04:00


 


Pulse  88   09/11/17 04:00


 


Resp  18   09/11/17 04:00


 


BP  137/67   09/11/17 04:00


 


Pulse Ox  95   09/11/17 04:00








 Intake & Output











 09/10/17 09/11/17 09/11/17





 18:59 06:59 18:59


 


Intake Total 330  


 


Output Total 250  


 


Balance 80  


 


Intake:   


 


  Oral 330  


 


Output:   


 


  Urine 250  


 


Other:   


 


  Voiding Method  Toilet 


 


  # Voids 3 0 














- Exam


- Constitutional


General appearance: average body habitus, cooperative, no acute distress





- EENT


Eyes: anicteric sclerae, EOMI, PERRLA, dentition normal


ENT: NA/AT, normal oropharynx





- Neck


Neck: no lymphadenopathy, normal ROM, no other, no rigidity, no stridor, no 

thyromegaly





- Respiratory


Respiratory: bilateral: CTA, negative: diminished, dullness, rales, rhonchi, 

wheezing





- Cardiovascular


Rhythm: regular


Heart sounds: normal: S1, S2


Abnormal Heart Sounds: no systolic murmur, no diastolic murmur, no rub, no S3 

Gallop, no S4 Gallop, no click, no other





- Gastrointestinal


General gastrointestinal: normal bowel sounds, soft





- Integumentary


Integumentary: decreased turgor, normal





- Neurologic


Neurologic: CNII-XII intact





- Musculoskeletal


Musculoskeletal: gait normal, strength equal bilaterally





- Psychiatric


Psychiatric: A&O x's 3 (Significant short-term memory loss), appropriate affect

, intact judgment & insight








- Labs


CBC & Chem 7: 


 09/09/17 19:00





 09/10/17 05:54


Labs: 


 Abnormal Lab Results - Last 24 Hours (Table)











  09/10/17 09/10/17 Range/Units





  05:54 10:05 


 


TSH  <0.015 L   (0.465-4.680)  mIU/L


 


Free T4  2.79 H   (0.78-2.19)  ng/dL


 


Urine Glucose (UA)   2+ H  (Negative)  








 Microbiology - Last 24 Hours (Table)











 09/10/17 10:05 Urine Culture - Preliminary





 Urine,Clean Catch 














Assessment and Plan


Plan: 


1. Recurrent syncope. Patient is on consultation by cardiology, she follows 

with Dr. Wong outpatient for her neurology needs, EEG of the brain requested, 

CTA of the neck shows a 1.3 cm saccular aneurysm of the ICA near the petrous 

bone, this would be an incidental finding rather than direct consequence of her 

syncope, patient would need orthostatic vital signs here outpatient tilt table 

test, she had a recent 2 day Holter monitor performed June 26, 2017 which 

showed 41% of documented heart rhythm is in sinus bradycardia, lowest HR 41, no 

pauses over 2.5 seconds, rare episodes of bigeminy and trigeminy and PVCs. 

Patient patient is in selective with telemetry monitoring. Patient will be seen 

by cardiology, most likely would need  a loop recorder for a 30 day event 

monitor, tilt table test.





2. Left breast cancer status post mastectomy the current treatments at this time





3. Hypothyroidism on thyroid supplementation levothyroxine 125 g daily.





4. 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 

the petrous bone, Dr. Méndez is on consult. CTA failed to reveal any 

significant and internal carotid stenosis. 





5. Neurocognitive degenerative disease of the brain, follows with Dr. Wong for 

outpatient, patient is receiving B12 shots as often as every week, patient's 

off anticholinesterase medications however this agents can also potentiate 

bradycardia and arrhythmias specially with the use of Aricept, unsure whether 

the patient had been on an NMDA receptor antagonist such as Namenda. 





6. 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 

for infection, no ongoing GI losses identified at this time





7. GI prophylaxis





8. DVT prophylaxis





The above impression and plan of care have been discussed and directed by 

signing physician. Aneta Carbajal nurse practitioner acting as scribe for 

signing physician.

## 2017-09-15 ENCOUNTER — HOSPITAL ENCOUNTER (OUTPATIENT)
Dept: HOSPITAL 47 - CATHEP | Age: 82
Discharge: HOME | End: 2017-09-15
Payer: MEDICARE

## 2017-09-15 VITALS — BODY MASS INDEX: 24.7 KG/M2

## 2017-09-15 VITALS — HEART RATE: 70 BPM | TEMPERATURE: 98 F

## 2017-09-15 VITALS — DIASTOLIC BLOOD PRESSURE: 68 MMHG | RESPIRATION RATE: 20 BRPM | SYSTOLIC BLOOD PRESSURE: 145 MMHG

## 2017-09-15 DIAGNOSIS — R55: Primary | ICD-10-CM

## 2017-09-15 PROCEDURE — 33282: CPT

## 2017-09-15 NOTE — P.PCN
Date of Procedure: 09/15/17


Preoperative Diagnosis: 


Unexplained syncope


Postoperative Diagnosis: 


The same


Procedure(s) Performed: 


Loop recorder insertion


Description of Procedure: 


This 85-year-old female was recently admitted to the hospital with unexplained 

syncope.  Patient was evaluated by Dr. Grant who recommended insertion of the 

loop recorder.  Patient was brought to the lab in a fasting state.  She was 

given conscious sedation with 25 g of fentanyl.  The skin in the third 

intercostal space was infiltrated with lidocaine.  An incision was drawn made 

in the skin and loop recorder was inserted in the usual fashion.  Patient 

tolerated the procedure well.  Satisfactory thresholds were obtained.  The skin 

was closed with one status which.





Programming.  Tachycardia detection is programmed to a rate of 1 46 bpm for 16 

beats area did Ashley detection is programmed to 30 bpm for 4 beats.  Pauses of 

more than 3 seconds are programmed to be recorded.  If dissection is on.  The 

sensitivity is programmed to 0.035 mV.





Plan: Patient will be monitored for the next several hours.  If stable, she'll 

be discharged home.  She'll continue home medications and also prophylactic 

antibiotic in the form of Keflex 500 mg 3 times a dayfor 3 days

## 2017-11-06 ENCOUNTER — HOSPITAL ENCOUNTER (OUTPATIENT)
Dept: HOSPITAL 47 - RADMAMWWP | Age: 82
Discharge: HOME | End: 2017-11-06
Attending: INTERNAL MEDICINE
Payer: MEDICARE

## 2017-11-06 DIAGNOSIS — Z08: Primary | ICD-10-CM

## 2017-11-06 DIAGNOSIS — Z85.3: ICD-10-CM

## 2017-11-06 NOTE — MM
Reason for exam: additional evaluation requested from prior study.

Last mammogram was performed 1 year ago.



History:

Patient is postmenopausal and has history of breast cancer at age 81.

Family history of premenopausal breast cancer in sister.

Mastectomy of the left breast, November 26, 2012.  Malignant US LT VAD breast 

biopsy of the left breast, November 15, 2012.  Benign core biopsy of the left 

breast, Margo 3, 1997.

Took estrogen for 15 years beginning at age 41.  Took progesterone for 15 years 

beginning at age 41.



Physical Findings:

Nurse did not find any significant physical abnormalities on exam.



MG 3D Diag Mammo W/Cad RT

CC and MLO view(s) were taken of the right breast.

Prior study comparison: November 3, 2016, right breast MG 3d diag mammo w/cad RT. 

November 2, 2015, right breast MG 3d diag mammo w/cad RT.

There are scattered fibroglandular densities.  No suspicious calcifications are 

seen. Vascular calcifications. There is no discrete abnormality.

No significant new findings when compared with previous films.



These results were verbally communicated with the patient and result sheet given 

to the patient on 11/6/17.





ASSESSMENT: Benign, BI-RAD 2



RECOMMENDATION:

Follow-up diagnostic mammogram of the right breast in 1 year.

## 2018-11-07 ENCOUNTER — HOSPITAL ENCOUNTER (OUTPATIENT)
Dept: HOSPITAL 47 - RADMAMWWP | Age: 83
Discharge: HOME | End: 2018-11-07
Attending: INTERNAL MEDICINE
Payer: MEDICARE

## 2018-11-07 DIAGNOSIS — Z08: Primary | ICD-10-CM

## 2018-11-07 DIAGNOSIS — Z85.3: ICD-10-CM

## 2018-11-07 PROCEDURE — 77065 DX MAMMO INCL CAD UNI: CPT

## 2018-11-07 NOTE — MM
Reason for exam: additional evaluation requested from prior study.

Last mammogram was performed 1 year ago.



History:

Patient is postmenopausal and has history of breast cancer at age 81.

Family history of premenopausal breast cancer in sister.

Mastectomy of the left breast, November 26, 2012.  Malignant US LT VAD breast 

biopsy of the left breast, November 15, 2012.  Benign core biopsy of the left 

breast, Margo 3, 1997.

Took estrogen for 15 years beginning at age 41.  Took progesterone for 15 years 

beginning at age 41.



Physical Findings:

Nurse did not find any significant physical abnormalities on exam.



MG Diagnostic Mammo RT w CAD

CC and MLO view(s) were taken of the right breast.

Prior study comparison: November 6, 2017, right breast MG 3d diag mammo w/cad RT. 

November 3, 2016, right breast MG 3d diag mammo w/cad RT.

The breast tissue is heterogeneously dense. This may lower the sensitivity of 

mammography.  Stable benign calcifications. There is no discrete abnormality 

including area of concern.

No significant new findings when compared with previous films.



These results were verbally communicated with the patient and result sheet given 

to the patient on 11/7/18.





ASSESSMENT: Benign, BI-RAD 2



RECOMMENDATION:

Follow-up diagnostic mammogram of the right breast in 1 year.

## 2019-11-14 ENCOUNTER — HOSPITAL ENCOUNTER (OUTPATIENT)
Dept: HOSPITAL 47 - RADCTMAIN | Age: 84
Discharge: HOME | End: 2019-11-14
Attending: PSYCHIATRY & NEUROLOGY
Payer: MEDICARE

## 2019-11-14 DIAGNOSIS — M48.02: Primary | ICD-10-CM

## 2019-11-14 DIAGNOSIS — Z88.5: ICD-10-CM

## 2019-11-14 DIAGNOSIS — M51.26: ICD-10-CM

## 2019-11-14 DIAGNOSIS — M48.26: ICD-10-CM

## 2019-11-14 DIAGNOSIS — M46.96: ICD-10-CM

## 2019-11-14 DIAGNOSIS — M51.27: ICD-10-CM

## 2019-11-14 DIAGNOSIS — M51.37: ICD-10-CM

## 2019-11-14 DIAGNOSIS — Z88.1: ICD-10-CM

## 2019-11-14 DIAGNOSIS — M51.36: ICD-10-CM

## 2019-11-14 DIAGNOSIS — Z88.8: ICD-10-CM

## 2019-11-14 PROCEDURE — 72131 CT LUMBAR SPINE W/O DYE: CPT

## 2019-11-14 NOTE — CT
EXAMINATION TYPE: CT lumbar spine wo con

 

DATE OF EXAM: 11/14/2019

 

COMPARISON: None

 

HISTORY: 88-year-old female Lower back pain

 

TECHNIQUE: Contiguous axial scanning of the lumbar spine without IV contrast. Coronal and sagittal re
constructions performed.

 

CT DLP: 590.4 mGycm

Automated exposure control for dose reduction was used.

 

 

FINDINGS: 

Dense atherosclerotic calcifications infrarenal abdominal aorta.

 

Degenerative thinning of the interspinous ligaments with abutment of the spinous processes with scler
osis and cystic change.

 

Multilevel ligamentum flavum thickening with advanced hypertrophic facet arthropathy.

 

Overall alignment is maintained.

 

Mild multilevel degenerative disc disease with bulging discs throughout. Disc desiccation and mild di
sc height loss at L5-S1 is also present.

 

Changes result in mild narrowing of the spinal canal and L2-L3, L3-L4, L4-L5.

 

On the right, changes resulting mild multilevel neuroforaminal stenoses, more moderate at L4-L5.

 

On the left, changes result in mild multilevel neuroforaminal stenoses, more moderate at L4-L5.

 

 

 

IMPRESSION: 

 

1. BAASTRUP'S DISEASE.

2. ADVANCED HYPERTROPHIC FACET ARTHROPATHY THROUGHOUT WITH PRESERVED ALIGNMENT.

3. MILD MULTILEVEL DEGENERATIVE DISC DISEASE WITH BULGING DISCS THROUGHOUT. MORE MODERATE DEGENERATIV
E DISC DISEASE AT L5-S1.

4. MILD SPINAL CANAL STENOSES FROM L2 THROUGH L5 LEVELS.

5. VARIABLE MILD NEUROFORAMINAL STENOSES THROUGHOUT, MODERATE ON BOTH SIDES AT L4-L5.

## 2019-12-16 ENCOUNTER — HOSPITAL ENCOUNTER (OUTPATIENT)
Dept: HOSPITAL 47 - RADUSWWP | Age: 84
End: 2019-12-16
Attending: SURGERY
Payer: MEDICARE

## 2019-12-16 VITALS — DIASTOLIC BLOOD PRESSURE: 69 MMHG | HEART RATE: 63 BPM | SYSTOLIC BLOOD PRESSURE: 117 MMHG

## 2019-12-16 VITALS — RESPIRATION RATE: 16 BRPM | TEMPERATURE: 97.7 F

## 2019-12-16 DIAGNOSIS — C50.911: Primary | ICD-10-CM

## 2019-12-16 DIAGNOSIS — Z17.0: ICD-10-CM

## 2019-12-16 PROCEDURE — 19083 BX BREAST 1ST LESION US IMAG: CPT

## 2019-12-16 PROCEDURE — 88305 TISSUE EXAM BY PATHOLOGIST: CPT

## 2019-12-16 PROCEDURE — 88341 IMHCHEM/IMCYTCHM EA ADD ANTB: CPT

## 2019-12-16 PROCEDURE — 88342 IMHCHEM/IMCYTCHM 1ST ANTB: CPT

## 2019-12-16 PROCEDURE — 77065 DX MAMMO INCL CAD UNI: CPT

## 2019-12-16 NOTE — USB
EXAMINATION TYPE: US biopsy breast VAD RT, MG diagnostic mammo RT wo CAD

 

DATE OF EXAM: 12/16/2019

 

CLINICAL HISTORY: R92.8 Abnormal mammogram.

 

TECHNIQUE: Ultrasound guided core biopsy of right breast.  

 

COMPARISON: Outside report of whole breast ultrasound dated 11/25/2019.

 

FINDINGS: The procedure of ultrasound guided core biopsy was explained to the 
patient.  Benefits, alternatives, and risks were discussed.  An informed consent
was then obtained.  Preprocedural timeout was performed.

 

The patient was placed in supine positioning for  imaging and for the procedure.
The overlying skin was prepped and draped in usual sterile fashion. 10 cc of 1% 
lidocaine was used as anesthetic into the skin and subcutaneous tissue up to the
1 cm mass at the 2:00 position, 2 cm from nipple in the right breast.

 

Under ultrasound guidance, a 12-gauge vacuum assisted biopsy gun device was used
to obtain 6 core samples.  Following this, a wing-shaped biopsy marker was left 
in the mass.  Postprocedure mammogram demonstrates appropriate biopsy marker 
placement

 

The patient tolerated the procedure well without any immediate complication.  
The patient was kept in the radiology department for short stay after the 
procedure and then discharged home in stable condition.  

 

IMPRESSION: Successful, uncomplicated ultrasound guided core biopsy of the 1 cm 
mass at the 2:00 position, 2 cm from nipple in the right breast, full pathology 
results to follow. The axilla was scanned real time during the biopsy and no 
suspicious axillary nodes are seen on the right.

 

Pathology Results: Malignant

 

RIGHT BREAST, ULTRASOUND GUIDED CORE BIOPSY: Invasive ductal carcinoma (Grade 2)
and intermediate grade ductal carcinoma in situ. See Surgical Pathology Cancer 
Case Summary. 



Recommendation

Surgical consult of the right breast.

[facility]

## 2020-01-10 ENCOUNTER — HOSPITAL ENCOUNTER (OUTPATIENT)
Dept: HOSPITAL 47 - OR | Age: 85
LOS: 2 days | Discharge: HOME HEALTH | End: 2020-01-12
Attending: SURGERY
Payer: MEDICARE

## 2020-01-10 VITALS — BODY MASS INDEX: 24.7 KG/M2

## 2020-01-10 DIAGNOSIS — Z79.82: ICD-10-CM

## 2020-01-10 DIAGNOSIS — Z90.12: ICD-10-CM

## 2020-01-10 DIAGNOSIS — I08.2: ICD-10-CM

## 2020-01-10 DIAGNOSIS — M54.9: ICD-10-CM

## 2020-01-10 DIAGNOSIS — F02.80: ICD-10-CM

## 2020-01-10 DIAGNOSIS — D64.9: ICD-10-CM

## 2020-01-10 DIAGNOSIS — M19.90: ICD-10-CM

## 2020-01-10 DIAGNOSIS — G30.9: ICD-10-CM

## 2020-01-10 DIAGNOSIS — Z80.0: ICD-10-CM

## 2020-01-10 DIAGNOSIS — Z80.1: ICD-10-CM

## 2020-01-10 DIAGNOSIS — Z80.3: ICD-10-CM

## 2020-01-10 DIAGNOSIS — E03.9: ICD-10-CM

## 2020-01-10 DIAGNOSIS — Z87.898: ICD-10-CM

## 2020-01-10 DIAGNOSIS — C50.911: Primary | ICD-10-CM

## 2020-01-10 DIAGNOSIS — Z79.890: ICD-10-CM

## 2020-01-10 DIAGNOSIS — L76.32: ICD-10-CM

## 2020-01-10 DIAGNOSIS — R55: ICD-10-CM

## 2020-01-10 DIAGNOSIS — Z88.5: ICD-10-CM

## 2020-01-10 DIAGNOSIS — I45.10: ICD-10-CM

## 2020-01-10 DIAGNOSIS — Z85.3: ICD-10-CM

## 2020-01-10 DIAGNOSIS — Z98.41: ICD-10-CM

## 2020-01-10 DIAGNOSIS — Z98.890: ICD-10-CM

## 2020-01-10 DIAGNOSIS — Z79.899: ICD-10-CM

## 2020-01-10 DIAGNOSIS — Z98.42: ICD-10-CM

## 2020-01-10 DIAGNOSIS — G89.29: ICD-10-CM

## 2020-01-10 DIAGNOSIS — M54.2: ICD-10-CM

## 2020-01-10 DIAGNOSIS — Z90.710: ICD-10-CM

## 2020-01-10 PROCEDURE — 93306 TTE W/DOPPLER COMPLETE: CPT

## 2020-01-10 PROCEDURE — 38525 BIOPSY/REMOVAL LYMPH NODES: CPT

## 2020-01-10 PROCEDURE — 88331 PATH CONSLTJ SURG 1 BLK 1SPC: CPT

## 2020-01-10 PROCEDURE — 88342 IMHCHEM/IMCYTCHM 1ST ANTB: CPT

## 2020-01-10 PROCEDURE — 85025 COMPLETE CBC W/AUTO DIFF WBC: CPT

## 2020-01-10 PROCEDURE — 38792 RA TRACER ID OF SENTINL NODE: CPT

## 2020-01-10 PROCEDURE — 88307 TISSUE EXAM BY PATHOLOGIST: CPT

## 2020-01-10 PROCEDURE — 19303 MAST SIMPLE COMPLETE: CPT

## 2020-01-10 PROCEDURE — 88341 IMHCHEM/IMCYTCHM EA ADD ANTB: CPT

## 2020-01-10 PROCEDURE — 84484 ASSAY OF TROPONIN QUANT: CPT

## 2020-01-10 RX ADMIN — POTASSIUM CHLORIDE SCH MLS: 14.9 INJECTION, SOLUTION INTRAVENOUS at 10:00

## 2020-01-10 RX ADMIN — HYDROCODONE BITARTRATE AND ACETAMINOPHEN PRN EACH: 5; 325 TABLET ORAL at 18:30

## 2020-01-10 NOTE — P.GSHP
History of Present Illness


H&P Date: 01/10/20


Chief Complaint: Right breast cancer





This is a 80-year-old female who was recently diagnosed left breast cancer.  

Patient presents today for right breast mastectomy with sentinel node biopsy.





Past Medical History


Past Medical History: Cancer, Memory Impairment, Osteoarthritis (OA), Thyroid 

Disorder


Additional Past Medical History / Comment(s): breast cancer, chronic back pain 

and neck pain, dizzy spells


History of Any Multi-Drug Resistant Organisms: None Reported


Past Surgical History: Bladder Surgery, Breast Surgery, Hysterectomy


Additional Past Surgical History / Comment(s): left mastectomy, yara cataracts


Past Anesthesia/Blood Transfusion Reactions: Motion Sickness


Additional Past Anesthesia/Blood Transfusion Reaction / Comment(s): gets dizzy 

if she lays down flat


Smoking Status: Never smoker





- Past Family History


  ** Father


Family Medical History: Cancer


Additional Family Medical History / Comment(s): Lung





  ** Mother


Family Medical History: Cancer


Additional Family Medical History / Comment(s): Colon metastasize to liver





  ** Sister(s)


Family Medical History: Cancer


Additional Family Medical History / Comment(s): Breast





Medications and Allergies


                                Home Medications











 Medication  Instructions  Recorded  Confirmed  Type


 


Aspirin EC [Ecotrin Low Dose] 81 mg PO DAILY 09/09/17 01/08/20 History


 


Cyanocobalamin [Vitamin B-12] 500 mcg PO HS 09/09/17 01/08/20 History


 


Levothyroxine Sodium [Synthroid] 125 mcg PO DAILY 09/09/17 01/08/20 History


 


Multivitamins, Thera [Multivitamin 1 tab PO DAILY 09/09/17 01/08/20 History





(formulary)]    


 


Fish Oil(Dose Unknown) 1 tab PO BID 09/13/17 01/08/20 History


 


Flaxseed Oil(Dose Unknown) 1 tab PO BID 09/13/17 01/08/20 History


 


Glucosam/Ney-Msm1/C/Ismael/Bosw 1 each PO DAILY 09/13/17 01/08/20 History





[Glucosamine-Chondroitin Tablet]    


 


Red Yeast Rice(Dose Unknown) 1 tab PO BID 09/13/17 01/10/20 History


 


Memantine [Namenda] 10 mg PO BID 01/08/20 01/08/20 History








                                    Allergies











Allergy/AdvReac Type Severity Reaction Status Date / Time


 


meperidine [From Demerol] Allergy  Rash/Hives Verified 01/10/20 09:58














Surgical - Exam


                                   Vital Signs











Temp Pulse Resp BP Pulse Ox


 


 97.8 F   65   17   150/78   95 


 


 01/10/20 09:58  01/10/20 09:58  01/10/20 09:58  01/10/20 09:58  01/10/20 09:58














- General


well developed, well nourished, no distress





- Eyes


PERRL





- ENT


normal pinna





- Neck


no masses





- Respiratory


normal expansion





- Cardiovascular


Rhythm: regular





- Abdomen


Abdomen: soft, non tender





- Integumentary





Healing core biopsy incision on the right breast





Assessment and Plan


Assessment: 


Right breast cancer





We'll perform right simple mastectomy with sentinel node biopsy.

## 2020-01-10 NOTE — P.GSHP
History of Present Illness


H&P Date: 01/10/20


Chief Complaint: Right breast cancer





This 88-year-old female who presents today for right breast simple mastectomy 

and sentinel biopsied.  The patient will also undergo right sentinel node 

injection.  Patient was recently diagnosed left breast cancer.





Past Medical History


Past Medical History: Cancer, Memory Impairment, Osteoarthritis (OA), Thyroid 

Disorder


Additional Past Medical History / Comment(s): breast cancer, chronic back pain 

and neck pain, dizzy spells


History of Any Multi-Drug Resistant Organisms: None Reported


Past Surgical History: Bladder Surgery, Breast Surgery, Hysterectomy


Additional Past Surgical History / Comment(s): left mastectomy, yara cataracts


Past Anesthesia/Blood Transfusion Reactions: Motion Sickness


Additional Past Anesthesia/Blood Transfusion Reaction / Comment(s): gets dizzy 

if she lays down flat


Smoking Status: Never smoker





- Past Family History


  ** Father


Family Medical History: Cancer


Additional Family Medical History / Comment(s): Lung





  ** Mother


Family Medical History: Cancer


Additional Family Medical History / Comment(s): Colon metastasize to liver





  ** Sister(s)


Family Medical History: Cancer


Additional Family Medical History / Comment(s): Breast





Medications and Allergies


                                Home Medications











 Medication  Instructions  Recorded  Confirmed  Type


 


Aspirin EC [Ecotrin Low Dose] 81 mg PO DAILY 09/09/17 01/08/20 History


 


Cyanocobalamin [Vitamin B-12] 500 mcg PO HS 09/09/17 01/08/20 History


 


Levothyroxine Sodium [Synthroid] 125 mcg PO DAILY 09/09/17 01/08/20 History


 


Multivitamins, Thera [Multivitamin 1 tab PO DAILY 09/09/17 01/08/20 History





(formulary)]    


 


Fish Oil(Dose Unknown) 1 tab PO BID 09/13/17 01/08/20 History


 


Flaxseed Oil(Dose Unknown) 1 tab PO BID 09/13/17 01/08/20 History


 


Glucosam/Ney-Msm1/C/Ismael/Bosw 1 each PO DAILY 09/13/17 01/08/20 History





[Glucosamine-Chondroitin Tablet]    


 


Red Yeast Rice(Dose Unknown) 1 tab PO BID 09/13/17 01/10/20 History


 


Memantine [Namenda] 10 mg PO BID 01/08/20 01/08/20 History








                                    Allergies











Allergy/AdvReac Type Severity Reaction Status Date / Time


 


meperidine [From Demerol] Allergy  Rash/Hives Verified 01/10/20 09:58














Surgical - Exam


                                   Vital Signs











Temp Pulse Resp BP Pulse Ox


 


 97.8 F   65   17   150/78   95 


 


 01/10/20 09:58  01/10/20 09:58  01/10/20 09:58  01/10/20 09:58  01/10/20 09:58














- General


well developed, well nourished, no distress





- Eyes


PERRL





- ENT


normal pinna





- Neck


no masses





- Respiratory


normal expansion





- Cardiovascular


Rhythm: regular





- Abdomen


Abdomen: soft, non tender





- Integumentary





Well-healed right breast core biopsy site





Assessment and Plan


Assessment: 





Right breast cancer





Patient will undergo sentinel node injection.





Patient will undergo right breast simple mastectomy with sentinel node biopsy

## 2020-01-10 NOTE — NM
EXAMINATION TYPE: NM sentinel node injection

 

DATE OF EXAM: 1/10/2020

 

COMPARISON: Mammogram December 16, 2019.

 

HISTORY: Newly diagnosed right-sided breast cancer on biopsy December 16.

 

TECHNIQUE AND FINDINGS: The procedure of sentinel lymph node injection was explained to the patient. 
 The benefits, alternatives, and risks were discussed.  An informed consent was then obtained.  

 

Overlying skin is cleaned with sterile alcohol.  Following this, 542 uCi [id number] Tilmanocept was inject
ed in the upper outer aspect of the right nipple intradermally.  

 

The patient tolerated the procedure well without any immediate complication.  The patient was kept in
 the radiology department for short stay after the procedure and then taken to surgery for surgical p
rocedure what is presumed intraoperative gamma probe will be used for sentinel lymph node detection. 
 

 

IMPRESSION:  Right breast radiotracer injection for sentinel node localization as above.

## 2020-01-10 NOTE — P.CONS
History of Present Illness





- Reason for Consult


Consult date: 01/10/20


Medical management





- Chief Complaint


Right breast mastectomy/right breast cancer





- History of Present Illness


80-year-old female patient with history of hypothyroidism, osteoarthritis and 

memory impairment who was recently diagnosed with right breast cancer, admitted 

to the hospital for elective right mastectomy with sentinel lymph node biopsy; 

patient is POD #0; internal medicine is consulted for medical management








Review of Systems








REVIEW OF SYSTEMS: 


CONSTITUTIONAL: No fever, no malaise, no fatigue. 


HEENT: No recent visual problems or hearing problems. Denied any sore throat. 


CARDIOVASCULAR: No chest pain, orthopnea, PND, no palpitations, no syncope. 


PULMONARY: No shortness of breath, no cough, no hemoptysis. 


GASTROINTESTINAL: No diarrhea, no nausea, no vomiting, no abdominal pain. 


NEUROLOGICAL: No headaches, no weakness, no numbness. 


HEMATOLOGICAL: Denies any bleeding or petechiae. 


GENITOURINARY: Denies any burning micturition, frequency, or urgency. 


MUSCULOSKELETAL/RHEUMATOLOGICAL: Denies any joint pain, swelling, or any muscle 

pain. 


ENDOCRINE: Denies any polyuria or polydipsia. 





The rest of the 14-point review of systems is negative.








Past Medical History


Past Medical History: Cancer, Memory Impairment, Osteoarthritis (OA), Thyroid 

Disorder


Additional Past Medical History / Comment(s): breast cancer, chronic back pain 

and neck pain, dizzy spells


History of Any Multi-Drug Resistant Organisms: None Reported


Past Surgical History: Bladder Surgery, Breast Surgery, Hysterectomy


Additional Past Surgical History / Comment(s): left mastectomy, yara cataracts


Past Anesthesia/Blood Transfusion Reactions: Motion Sickness


Additional Past Anesthesia/Blood Transfusion Reaction / Comm: gets dizzy if she 

lays down flat


Past Psychological History: No Psychological Hx Reported


Additional Psychological History / Comment(s): memory loss, alzheimers


Smoking Status: Never smoker


Past Alcohol Use History: None Reported


Past Drug Use History: None Reported





- Past Family History


  ** Father


Family Medical History: Cancer


Additional Family Medical History / Comment(s): Lung





  ** Mother


Family Medical History: Cancer


Additional Family Medical History / Comment(s): Colon metastasize to liver





  ** Sister(s)


Family Medical History: Cancer


Additional Family Medical History / Comment(s): Breast





Medications and Allergies


                                Home Medications











 Medication  Instructions  Recorded  Confirmed  Type


 


Aspirin EC [Ecotrin Low Dose] 81 mg PO DAILY 09/09/17 01/08/20 History


 


Cyanocobalamin [Vitamin B-12] 500 mcg PO HS 09/09/17 01/08/20 History


 


Levothyroxine Sodium [Synthroid] 125 mcg PO DAILY 09/09/17 01/08/20 History


 


Multivitamins, Thera [Multivitamin 1 tab PO DAILY 09/09/17 01/08/20 History





(formulary)]    


 


Fish Oil(Dose Unknown) 1 tab PO BID 09/13/17 01/08/20 History


 


Flaxseed Oil(Dose Unknown) 1 tab PO BID 09/13/17 01/08/20 History


 


Glucosam/Ney-Msm1/C/Ismael/Bosw 1 each PO DAILY 09/13/17 01/08/20 History





[Glucosamine-Chondroitin Tablet]    


 


Red Yeast Rice(Dose Unknown) 1 tab PO BID 09/13/17 01/10/20 History


 


Memantine [Namenda] 10 mg PO BID 01/08/20 01/08/20 History


 


Docusate [Colace] 100 mg PO BID #20 capsule 01/10/20  Rx


 


HYDROcodone/APAP 5-325MG [Norco 1 tab PO Q6HR PRN #10 tab 01/10/20  Rx





5-325]    








                                    Allergies











Allergy/AdvReac Type Severity Reaction Status Date / Time


 


meperidine [From Demerol] Allergy  Rash/Hives Verified 01/10/20 09:58














Physical Exam


Vitals: 


                                   Vital Signs











  Temp Pulse Pulse Resp BP Pulse Ox


 


 01/10/20 16:15   59 L    127/66 


 


 01/10/20 16:04   61   16  


 


 01/10/20 16:00   58 L    131/73 


 


 01/10/20 15:45   57 L    131/75 


 


 01/10/20 15:30  98 F  63   12  144/81  96


 


 01/10/20 14:47   61   16  140/66  95


 


 01/10/20 14:32   64   18  144/69  96


 


 01/10/20 14:17   64   18  139/66  97


 


 01/10/20 14:02   68   14  160/75  98


 


 01/10/20 13:47   63   18  124/56  97


 


 01/10/20 13:36  97 F L  65   16  123/59  96


 


 01/10/20 09:58  97.8 F   65  17  150/78  95








                                Intake and Output











 01/10/20 01/10/20 01/10/20





 06:59 14:59 22:59


 


Intake Total  950 


 


Output Total  30 


 


Balance  920 


 


Intake:   


 


  IV  950 


 


Output:   


 


  Estimated Blood Loss  30 


 


Other:   


 


  Weight   61.235 kg

















PHYSICAL EXAMINATION: 





GENERAL: The patient is alert and oriented x3, not in any acute distress. Well 

developed, well nourished. 


HEENT: Pupils are round and equally reacting to light. EOMI. No scleral icterus.

No conjunctival pallor. Normocephalic, atraumatic. No pharyngeal erythema. No 

thyromegaly. 


CARDIOVASCULAR: S1 and S2 present. No murmurs, rubs, or gallops. 


PULMONARY: Chest is clear to auscultation, no wheezing or crackles. 


ABDOMEN: Soft, nontender, nondistended, normoactive bowel sounds. No palpable 

organomegaly. 


MUSCULOSKELETAL: No joint swelling or deformity.


EXTREMITIES: No cyanosis, clubbing, or pedal edema. 


NEUROLOGICAL: Gross neurological examination did not reveal any focal deficits. 


SKIN: No rashes. 














Assessment and Plan


Assessment: 


1.  Recent diagnosis of right breast cancer; status post simple right mastectomy

with [place] lymph node biopsy; POD #0


- We will recommend incentive spirometry; early ambulation; pain control per 

your discretion





2.  Hypothyroidism; continue with home thyroid replacement therapy





3.  Osteoarthritis; continue with current pain control





4.  DVT prophylaxis; subcu Lovenox





CODE STATUS; full code





Time with Patient: Greater than 30

## 2020-01-11 VITALS — RESPIRATION RATE: 16 BRPM

## 2020-01-11 LAB
BASOPHILS # BLD AUTO: 0.1 K/UL (ref 0–0.2)
BASOPHILS NFR BLD AUTO: 1 %
EOSINOPHIL # BLD AUTO: 0.2 K/UL (ref 0–0.7)
EOSINOPHIL NFR BLD AUTO: 2 %
ERYTHROCYTE [DISTWIDTH] IN BLOOD BY AUTOMATED COUNT: 4.08 M/UL (ref 3.8–5.4)
ERYTHROCYTE [DISTWIDTH] IN BLOOD: 12.8 % (ref 11.5–15.5)
HCT VFR BLD AUTO: 36.6 % (ref 34–46)
HGB BLD-MCNC: 12.2 GM/DL (ref 11.4–16)
LYMPHOCYTES # SPEC AUTO: 2.4 K/UL (ref 1–4.8)
LYMPHOCYTES NFR SPEC AUTO: 33 %
MCH RBC QN AUTO: 29.9 PG (ref 25–35)
MCHC RBC AUTO-ENTMCNC: 33.2 G/DL (ref 31–37)
MCV RBC AUTO: 89.9 FL (ref 80–100)
MONOCYTES # BLD AUTO: 0.4 K/UL (ref 0–1)
MONOCYTES NFR BLD AUTO: 5 %
NEUTROPHILS # BLD AUTO: 4.1 K/UL (ref 1.3–7.7)
NEUTROPHILS NFR BLD AUTO: 57 %
PLATELET # BLD AUTO: 203 K/UL (ref 150–450)
WBC # BLD AUTO: 7.2 K/UL (ref 3.8–10.6)

## 2020-01-11 RX ADMIN — CEFAZOLIN SCH: 330 INJECTION, POWDER, FOR SOLUTION INTRAMUSCULAR; INTRAVENOUS at 16:19

## 2020-01-11 RX ADMIN — HYDROCODONE BITARTRATE AND ACETAMINOPHEN PRN EACH: 5; 325 TABLET ORAL at 04:12

## 2020-01-11 RX ADMIN — POTASSIUM CHLORIDE SCH: 14.9 INJECTION, SOLUTION INTRAVENOUS at 05:30

## 2020-01-11 RX ADMIN — ENOXAPARIN SODIUM SCH MG: 40 INJECTION SUBCUTANEOUS at 08:53

## 2020-01-11 NOTE — P.PN
Subjective


Progress Note Date: 01/11/20


Principal diagnosis: 





Right breast cancer





Patient says her pain is mild to moderate.  Sitting up in a chair currently.  No

nausea or vomiting.





Objective





- Vital Signs


Vital signs: 


                                   Vital Signs











Temp  98.6 F   01/11/20 07:00


 


Pulse  77   01/11/20 07:00


 


Resp  18   01/11/20 07:00


 


BP  96/57   01/11/20 07:00


 


Pulse Ox  95   01/11/20 07:00








                                 Intake & Output











 01/10/20 01/11/20 01/11/20





 18:59 06:59 18:59


 


Intake Total 950 590 


 


Output Total 30 70 35


 


Balance 920 520 -35


 


Weight 61.235 kg  


 


Intake:   


 


    


 


  Oral  590 


 


Output:   


 


  Drainage  70 35


 


    Right Breast  70 35


 


  Estimated Blood Loss 30  


 


Other:   


 


  Voiding Method  Toilet 


 


  # Voids  2 














- Exam





Right chest incision without ischemia, ecchymosis noted, small hematoma 

appreciated, DALJIT sanguinous





Assessment and Plan


(1) Malignant neoplasm of right breast


Narrative/Plan: 


Patient with a small hematoma postoperative.  No need for surgical drainage at 

this time.  Continue stripping drains.  Check CBC today and tomorrow.  

Reevaluate tomorrow.


Current Visit: Yes   Status: Acute   Code(s): C50.911 - MALIGNANT NEOPLASM OF 

UNSP SITE OF RIGHT FEMALE BREAST   SNOMED Code(s): 634102573

## 2020-01-11 NOTE — ECHOF
Referral Reason:syncope



MEASUREMENTS

--------

HEIGHT: 162.6 cm

WEIGHT: 61.2 kg

BP: 96/57

RVIDd:   2.7 cm     (< 3.3)

IVSd:   1.2 cm     (0.6 - 1.1)

LVIDd:   3.7 cm     (3.9 - 5.3)

LVPWd:   1.0 cm     (0.6 - 1.1)

IVSs:   1.6 cm

LVIDs:   2.6 cm

LVPWs:   1.5 cm

LA Diam:   3.0 cm     (2.7 - 3.8)

LAESV Index (A-L):   16.41 ml/m

Ao Diam:   3.2 cm     (2.0 - 3.7)

AV Cusp:   1.6 cm     (1.5 - 2.6)

MV EXCURSION:   14.946 mm     (> 18.000)

MV EF SLOPE:   76 mm/s     (70 - 150)

EPSS:   0.5 cm

MV E Shashi:   0.92 m/s

MV DecT:   231 ms

MV A Shashi:   1.12 m/s

MV E/A Ratio:   0.82 

RAP:   5.00 mmHg

RVSP:   28.34 mmHg







FINDINGS

--------

Sinus rhythm.

This was a technically adequate study.

The left ventricular size is normal.   There is borderline concentric left ventricular hypertrophy.  
 Overall left ventricular systolic function is normal with, an EF between 60 - 65 %.

The right ventricle is normal in size.

Normal LA  size by volume 22+/-6 ml/m2.

The right atrium is normal in size.

Interatrial and interventricular septum intact.

There is mild aortic valve sclerosis.

The mitral valve leaflets are mildly thickened.   Mild mitral annular calcification present.

Mild tricuspid regurgitation present.   Right ventricular systolic pressure is normal at < 35 mmHg.

Trace/mild (physiologic)  pulmonic regurgitation.

The aortic root size is normal.

Normal inferior vena cava with normal inspiratory collapse consistent with estimated right atrial pre
ssure of  5 mmHg.

Echo free space may represent effusion or a pericardial fat pad.



CONCLUSIONS

--------

1. Sinus rhythm.

2. This was a technically adequate study.

3. The left ventricular size is normal.

4. There is borderline concentric left ventricular hypertrophy.

5. Overall left ventricular systolic function is normal with, an EF between 60 - 65 %.

6. The right ventricle is normal in size.

7. Normal LA size by volume 22+/-6 ml/m2.

8. The right atrium is normal in size.

9. Interatrial and interventricular septum intact.

10. There is mild aortic valve sclerosis.

11. The mitral valve leaflets are mildly thickened.

12. Mild mitral annular calcification present.

13. Mild tricuspid regurgitation present.

14. Right ventricular systolic pressure is normal at < 35 mmHg.

15. Trace/mild (physiologic)  pulmonic regurgitation.

16. The aortic root size is normal.

17. Normal inferior vena cava with normal inspiratory collapse consistent with estimated right atrial
 pressure of  5 mmHg.

18. Echo free space may represent effusion or a pericardial fat pad.





SONOGRAPHER: Venus Arceo RDCS

## 2020-01-11 NOTE — P.PN
Subjective


Progress Note Date: 01/11/20


Principal diagnosis: 


Right breast cancer


Status post mastectomy





01/11/2020


Patient is seen and evaluated in room at bedside; nursing staff reporting a near

syncopal episode; small hematoma surgical site


Vital signs are reviewed and blood pressure is soft at 96/57; we will order 

orthostatic vital signs, stat EKG, troponin every 43, orthostatic vital signs 

every shift and 2-D echo; start patient on IV fluids normal saline at rate of 

100 mL an hour; consult cardiology for further recommendations








Objective





- Vital Signs


Vital signs: 


                                   Vital Signs











Temp  98.6 F   01/11/20 07:00


 


Pulse  77   01/11/20 07:00


 


Resp  18   01/11/20 07:00


 


BP  96/57   01/11/20 07:00


 


Pulse Ox  95   01/11/20 07:00








                                 Intake & Output











 01/10/20 01/11/20 01/11/20





 18:59 06:59 18:59


 


Intake Total 950 590 


 


Output Total 30 70 80


 


Balance 920 520 -80


 


Weight 61.235 kg  


 


Intake:   


 


    


 


  Oral  590 


 


Output:   


 


  Drainage  70 80


 


    Right Breast  70 80


 


  Estimated Blood Loss 30  


 


Other:   


 


  Voiding Method  Toilet 


 


  # Voids  2 














- Exam








PHYSICAL EXAMINATION: 





GENERAL: The patient is alert and oriented x3, not in any acute distress. Well 

developed, well nourished. 


HEENT: Pupils are round and equally reacting to light. EOMI. No scleral icterus.

No conjunctival pallor. Normocephalic, atraumatic. No pharyngeal erythema. No 

thyromegaly. 


CARDIOVASCULAR: S1 and S2 present. No murmurs, rubs, or gallops. 


PULMONARY: Chest is clear to auscultation, no wheezing or crackles. 


ABDOMEN: Soft, nontender, nondistended, normoactive bowel sounds. No palpable 

organomegaly. 


MUSCULOSKELETAL: No joint swelling or deformity.


EXTREMITIES: No cyanosis, clubbing, or pedal edema. 


NEUROLOGICAL: Gross neurological examination did not reveal any focal deficits. 


SKIN: No rashes. 














- Labs


CBC & Chem 7: 


                                 01/11/20 10:17








Assessment and Plan


Assessment: 


1.  Recent diagnosis of right breast cancer; status post simple right mastectomy

with [place] lymph node biopsy; POD #0


- We will recommend incentive spirometry; early ambulation; pain control per 

your discretion





2.  Hypothyroidism; continue with home thyroid replacement therapy





3.  Osteoarthritis; continue with current pain control





4.  DVT prophylaxis; subcu Lovenox





CODE STATUS; full code

## 2020-01-12 VITALS — TEMPERATURE: 98 F | SYSTOLIC BLOOD PRESSURE: 118 MMHG | DIASTOLIC BLOOD PRESSURE: 71 MMHG | HEART RATE: 74 BPM

## 2020-01-12 LAB
BASOPHILS # BLD AUTO: 0.1 K/UL (ref 0–0.2)
BASOPHILS NFR BLD AUTO: 1 %
EOSINOPHIL # BLD AUTO: 0.3 K/UL (ref 0–0.7)
EOSINOPHIL NFR BLD AUTO: 6 %
ERYTHROCYTE [DISTWIDTH] IN BLOOD BY AUTOMATED COUNT: 3.21 M/UL (ref 3.8–5.4)
ERYTHROCYTE [DISTWIDTH] IN BLOOD: 13 % (ref 11.5–15.5)
HCT VFR BLD AUTO: 28.7 % (ref 34–46)
HGB BLD-MCNC: 9.4 GM/DL (ref 11.4–16)
LYMPHOCYTES # SPEC AUTO: 2.4 K/UL (ref 1–4.8)
LYMPHOCYTES NFR SPEC AUTO: 45 %
MCH RBC QN AUTO: 29.4 PG (ref 25–35)
MCHC RBC AUTO-ENTMCNC: 32.9 G/DL (ref 31–37)
MCV RBC AUTO: 89.4 FL (ref 80–100)
MONOCYTES # BLD AUTO: 0.4 K/UL (ref 0–1)
MONOCYTES NFR BLD AUTO: 7 %
NEUTROPHILS # BLD AUTO: 2.1 K/UL (ref 1.3–7.7)
NEUTROPHILS NFR BLD AUTO: 39 %
PLATELET # BLD AUTO: 154 K/UL (ref 150–450)
WBC # BLD AUTO: 5.3 K/UL (ref 3.8–10.6)

## 2020-01-12 RX ADMIN — POTASSIUM CHLORIDE SCH: 14.9 INJECTION, SOLUTION INTRAVENOUS at 09:02

## 2020-01-12 RX ADMIN — HYDROCODONE BITARTRATE AND ACETAMINOPHEN PRN EACH: 5; 325 TABLET ORAL at 00:00

## 2020-01-12 RX ADMIN — ENOXAPARIN SODIUM SCH MG: 40 INJECTION SUBCUTANEOUS at 09:02

## 2020-01-12 RX ADMIN — CEFAZOLIN SCH: 330 INJECTION, POWDER, FOR SOLUTION INTRAMUSCULAR; INTRAVENOUS at 13:41

## 2020-01-12 RX ADMIN — CEFAZOLIN SCH MLS/HR: 330 INJECTION, POWDER, FOR SOLUTION INTRAMUSCULAR; INTRAVENOUS at 00:01

## 2020-01-12 NOTE — P.CRDCN
History of Present Illness


History of present illness: 





HISTORY OF PRESENTING ILLNESS


This is a pleasant 88-year-old  female past medical history significant

for breast cancer status post left mastectomy and recent right mastectomy, freq

uent episodes of syncope loop recorder in place and dementia.  She follows in 

the office with Dr. Grant. We have been asked to see in consultation for near-

syncope.  She underwent right mastectomy with Dr. Rai on January 10.  

Yesterday afternoon to her and her son were walking in the [place] and she said she

felt the discomfort in her head and necks thing she fell.  Her son was able to 

catch her.  There was no positive loss of consciousness.  She denied having 

chest pain, dizziness or shortness of breath prior to falling.  She was not on 

the monitor at that time.  Telemetry monitor was placed shortly thereafter and 

has been unremarkable.  There is frequent artifact secondary to recent mas

tectomy and lead placement.  Blood pressure and EKG obtained at this time are 

unremarkable.  Echocardiogram ordered revealed preserved LV systolic function 

with ejection fraction 60-65%.  Laboratory data reviewed, WBC 5.3, hemoglobin 

9.4 down from 12.2 and troponin negative 1.  She takes no daily cardiac 

medications.





REVIEW OF SYSTEMS


At the time of my exam:


CONSTITUTIONAL: Denies fever or chills.


CARDIOVASCULAR: Denies chest pain, shortness of breath, orthopnea, PND or 

palpitations.


RESPIRATORY: Denies cough. 


GASTROINTESTINAL: Denies abdominal pain, diarrhea, constipation, nausea or 

vomiting.


MUSCULOSKELETAL: Denies myalgias.


NEUROLOGIC: Denies numbness, tingling or weakness.


ENDOCRINE: Denies fatigue, weight change,  polydipsia or polyurina.


GENITOURINARY: Denies burning, hematuria or urgency with micturation.


HEMATOLOGIC: Denies history of anemia or bleeding. 





PHYSICAL EXAMINATION


Blood pressure 118/71 heart rate 84 afebrile and maintaining oxygen saturation 

on nasal cannula.


CONSTITUTIONAL: No apparent distress. 


HEENT: Head is normocephalic. Pupils are equal, round. Sclerae anicteric. Mucous

membranes of the mouth are moist.  No JVD. No carotid bruit.


CHEST EXAMINATION: Lungs are clear to auscultation. No chest wall tenderness is 

noted on palpation or with deep breathing. 


HEART EXAMINATION: Regular rate and rhythm. S1, S2 heard. No murmurs, gallops or

rub.


ABDOMEN: Soft, nontender. Positive bowel sounds.


EXTREMITIES: 2+ peripheral pulses, no lower extremity edema and no calf 

tenderness.


NEUROLOGIC EXAMINATION: Patient is awake, alert and oriented to self. 





ASSESSMENT


Near syncope


Right mastectomy postoperative day #2


Loop recorder in place secondary to frequent syncopal episodes


Anemia





PLAN


Check for orthostatic changes.


Loop recorder interrogation unremarkable. 


Stable for discharge from a cardiac perspective. 


No cardiac etiology for near syncope. 


Follow up with Dr. Grant upon discharge. 








Nurse Practitioner note has been reviewed, I agree with a documented findings 

and plan of care.  Patient was seen and examined.











Past Medical History


Past Medical History: Cancer, Memory Impairment, Osteoarthritis (OA), Thyroid 

Disorder


Additional Past Medical History / Comment(s): breast cancer, chronic back pain 

and neck pain, dizzy spells


History of Any Multi-Drug Resistant Organisms: None Reported


Past Surgical History: Bladder Surgery, Breast Surgery, Hysterectomy


Additional Past Surgical History / Comment(s): left mastectomy, yara cataracts


Past Anesthesia/Blood Transfusion Reactions: Motion Sickness


Additional Past Anesthesia/Blood Transfusion Reaction / Comment(s): gets dizzy 

if she lays down flat


Past Psychological History: No Psychological Hx Reported


Additional Psychological History / Comment(s): memory loss, alzheimers


Smoking Status: Never smoker


Past Alcohol Use History: None Reported


Past Drug Use History: None Reported





- Past Family History


  ** Father


Family Medical History: Cancer


Additional Family Medical History / Comment(s): Lung





  ** Mother


Family Medical History: Cancer


Additional Family Medical History / Comment(s): Colon metastasize to liver





  ** Sister(s)


Family Medical History: Cancer


Additional Family Medical History / Comment(s): Breast





Medications and Allergies


                                Home Medications











 Medication  Instructions  Recorded  Confirmed  Type


 


Aspirin EC [Ecotrin Low Dose] 81 mg PO DAILY 09/09/17 01/08/20 History


 


Cyanocobalamin [Vitamin B-12] 500 mcg PO HS 09/09/17 01/08/20 History


 


Levothyroxine Sodium [Synthroid] 125 mcg PO DAILY 09/09/17 01/08/20 History


 


Multivitamins, Thera [Multivitamin 1 tab PO DAILY 09/09/17 01/08/20 History





(formulary)]    


 


Fish Oil(Dose Unknown) 1 tab PO BID 09/13/17 01/08/20 History


 


Flaxseed Oil(Dose Unknown) 1 tab PO BID 09/13/17 01/08/20 History


 


Glucosam/Ney-Msm1/C/Ismael/Bosw 1 each PO DAILY 09/13/17 01/08/20 History





[Glucosamine-Chondroitin Tablet]    


 


Red Yeast Rice(Dose Unknown) 1 tab PO BID 09/13/17 01/10/20 History


 


Memantine [Namenda] 10 mg PO BID 01/08/20 01/08/20 History


 


Docusate [Colace] 100 mg PO BID #20 capsule 01/10/20  Rx


 


HYDROcodone/APAP 5-325MG [Norco 1 tab PO Q6HR PRN #10 tab 01/10/20  Rx





5-325]    








                                    Allergies











Allergy/AdvReac Type Severity Reaction Status Date / Time


 


meperidine [From Demerol] Allergy  Rash/Hives Verified 01/10/20 09:58














Physical Exam


Vitals: 


                                   Vital Signs











  Temp Pulse Pulse Resp BP Pulse Ox


 


 01/12/20 07:00  98.0 F   74  16  118/71  92 L


 


 01/12/20 03:09  98.2 F   78  16  116/63  94 L


 


 01/11/20 20:21  97.3 F L   84  16  121/58  90 L


 


 01/11/20 18:50      115/60 


 


 01/11/20 18:48      100/58 


 


 01/11/20 15:00  98.0 F   71  16  124/72  94 L


 


 01/11/20 13:05   72    119/68 


 


 01/11/20 13:00  97.8 F  71    112/64  98








                                Intake and Output











 01/11/20 01/12/20 01/12/20





 22:59 06:59 14:59


 


Intake Total 590  


 


Output Total  65 


 


Balance 590 -65 


 


Intake:   


 


  Oral 590  


 


Output:   


 


  Drainage  65 


 


    Right Breast  65 


 


Other:   


 


  # Voids 2  














Results





                                 01/12/20 06:12





                                 Cardiac Enzymes











  01/11/20 Range/Units





  14:14 


 


Troponin I  <0.012  (0.000-0.034)  ng/mL








                                       CBC











  01/11/20 01/12/20 Range/Units





  10:17 06:12 


 


WBC  7.2  5.3  (3.8-10.6)  k/uL


 


RBC  4.08  3.21 L  (3.80-5.40)  m/uL


 


Hgb  12.2  9.4 L D  (11.4-16.0)  gm/dL


 


Hct  36.6  28.7 L  (34.0-46.0)  %


 


Plt Count  203  154  (150-450)  k/uL








                               Current Medications











Generic Name Dose Route Start Last Admin





  Trade Name Freq  PRN Reason Stop Dose Admin


 


Hydrocodone Bitart/Acetaminophen  1 each  01/10/20 13:33  01/12/20 00:00





  [place] 5-325  PO   1 each





  Q4HR PRN   Administration





  Mild Pain  


 


Enoxaparin Sodium  40 mg  01/11/20 09:00  01/12/20 09:02





  Lovenox  SQ   40 mg





  DAILY FAUSTO   Administration


 


Hydromorphone HCl  0.5 mg  01/10/20 13:33 





  Dilaudid  IVP  





  Q3HR PRN  





  Moderate to Severe Pain  


 


Lactated Ringer's  1,000 mls @ 20 mls/hr  01/10/20 05:59  01/12/20 09:02





  Lactated Ringers  IV   Not Given





  .Q24H FAUSTO  


 


Sodium Chloride  1,000 mls @ 100 mls/hr  01/11/20 13:30  01/12/20 00:01





  Saline 0.9%  IV   100 mls/hr





  .Q10H FAUSTO   Administration


 


Naloxone HCl  0.2 mg  01/10/20 13:33 





  Narcan  IV  





  Q2M PRN  





  Opioid Reversal  


 


Ondansetron HCl  4 mg  01/10/20 13:33 





  Zofran  IVP  





  Q8HR PRN  





  Nausea And Vomiting  








                                Intake and Output











 01/11/20 01/12/20 01/12/20





 22:59 06:59 14:59


 


Intake Total 590  


 


Output Total  65 


 


Balance 590 -65 


 


Intake:   


 


  Oral 590  


 


Output:   


 


  Drainage  65 


 


    Right Breast  65 


 


Other:   


 


  # Voids 2  








                                        





                                 01/12/20 06:12

## 2020-01-12 NOTE — P.PN
Subjective


Progress Note Date: 01/12/20


Principal diagnosis: 





Right breast cancer





Patient doing well this morning.  Only mild discomfort.  DALJIT drain is now 

serosanguineous.  Hemoglobin did drop to 9.4.  She had an episode yesterday of 

near syncope.  She apparently has had these episodes recently.  Workup by 

medicine thus far negative.  She would like to go home today.  She is afebrile 

with stable vitals.





Objective





- Vital Signs


Vital signs: 


                                   Vital Signs











Temp  98.0 F   01/12/20 07:00


 


Pulse  74   01/12/20 07:00


 


Resp  16   01/12/20 07:00


 


BP  118/71   01/12/20 07:00


 


Pulse Ox  92 L  01/12/20 07:00








                                 Intake & Output











 01/11/20 01/12/20 01/12/20





 18:59 06:59 18:59


 


Intake Total 100 590 


 


Output Total 110 65 


 


Balance -10 525 


 


Intake:   


 


  Intake, IV Titration 100  





  Amount   


 


    Sodium Chloride 0.9% 1, 100  





    000 ml @ 100 mls/hr IV .   





    Q10H FAUSTO Rx#:[id number]   


 


  Oral  590 


 


Output:   


 


  Drainage 110 65 


 


    Right Breast 110 65 


 


Other:   


 


  # Voids 2 2 














- Exam





Right chest wall incision clean and dry, ecchymosis noted, small hematoma 

actually improved from yesterday





- Labs


CBC & Chem 7: 


                                 01/12/20 06:12





Labs: 


                  Abnormal Lab Results - Last 24 Hours (Table)











  01/12/20 Range/Units





  06:12 


 


RBC  3.21 L  (3.80-5.40)  m/uL


 


Hgb  9.4 L D  (11.4-16.0)  gm/dL


 


Hct  28.7 L  (34.0-46.0)  %














Assessment and Plan


(1) Malignant neoplasm of right breast


Narrative/Plan: 


Patient doing fairly well at this time.  She would like to be discharged.  We'll

discuss with the hospitalist service.  Plan discharge if they are agreeable.


Current Visit: Yes   Status: Acute   Code(s): C50.911 - MALIGNANT NEOPLASM OF 

UNSP SITE OF RIGHT FEMALE BREAST   SNOMED Code(s): 167661076

## 2020-01-12 NOTE — P.PN
Subjective


Progress Note Date: 01/12/20


Principal diagnosis: 


Right breast cancer


Status post mastectomy





01/11/2020


Patient is seen and evaluated in room at bedside; nursing staff reporting a near

syncopal episode; small hematoma surgical site


Vital signs are reviewed and blood pressure is soft at 96/57; we will order 

orthostatic vital signs, stat EKG, troponin every 43, orthostatic vital signs 

every shift and 2-D echo; start patient on IV fluids normal saline at rate of 

100 mL an hour; consult cardiology for further recommendations





01/12/2020


Patient is seen and evaluated in room at bedside; no further episodes of 

syncope; workup has been unremarkable; blood pressure is improved on IV fluid 

hydration; 2-D echo is reviewed and is stable; patient was evaluated by 

cardiology and no further inpatient workup was recommended; patient can be 

discharged on surgery discretion





Objective





- Vital Signs


Vital signs: 


                                   Vital Signs











Temp  98.0 F   01/12/20 07:00


 


Pulse  74   01/12/20 07:00


 


Resp  16   01/12/20 07:00


 


BP  118/71   01/12/20 07:00


 


Pulse Ox  92 L  01/12/20 07:00








                                 Intake & Output











 01/11/20 01/12/20 01/12/20





 18:59 06:59 18:59


 


Intake Total 100 590 


 


Output Total 110 65 


 


Balance -10 525 


 


Intake:   


 


  Intake, IV Titration 100  





  Amount   


 


    Sodium Chloride 0.9% 1, 100  





    000 ml @ 100 mls/hr IV .   





    Q10H FAUSTO Rx#:[id number]   


 


  Oral  590 


 


Output:   


 


  Drainage 110 65 


 


    Right Breast 110 65 


 


Other:   


 


  # Voids 2 2 














- Exam








PHYSICAL EXAMINATION: 





GENERAL: The patient is alert and oriented x3, not in any acute distress. Well 

developed, well nourished. 


HEENT: Pupils are round and equally reacting to light. EOMI. No scleral icterus.

No conjunctival pallor. Normocephalic, atraumatic. No pharyngeal erythema. No 

thyromegaly. 


CARDIOVASCULAR: S1 and S2 present. No murmurs, rubs, or gallops. 


PULMONARY: Chest is clear to auscultation, no wheezing or crackles. 


ABDOMEN: Soft, nontender, nondistended, normoactive bowel sounds. No palpable 

organomegaly. 


MUSCULOSKELETAL: No joint swelling or deformity.


EXTREMITIES: No cyanosis, clubbing, or pedal edema. 


NEUROLOGICAL: Gross neurological examination did not reveal any focal deficits. 


SKIN: No rashes. 














- Labs


CBC & Chem 7: 


                                 01/12/20 06:12





Labs: 


                  Abnormal Lab Results - Last 24 Hours (Table)











  01/12/20 Range/Units





  06:12 


 


RBC  3.21 L  (3.80-5.40)  m/uL


 


Hgb  9.4 L D  (11.4-16.0)  gm/dL


 


Hct  28.7 L  (34.0-46.0)  %














Assessment and Plan


Assessment: 


1.  Recent diagnosis of right breast cancer; status post simple right mastectomy

with [place] lymph node biopsy; POD #0


- We will recommend incentive spirometry; early ambulation; pain control per 

your discretion





2.  Hypothyroidism; continue with home thyroid replacement therapy





3.  Osteoarthritis; continue with current pain control





4.  DVT prophylaxis; subcu Lovenox





CODE STATUS; full code

## 2020-01-18 NOTE — P.OP
Date of Procedure: 01/10/20


Preoperative Diagnosis: 


Right breast cancer


Postoperative Diagnosis: 


Right breast cancer


Procedure(s) Performed: 


Right simple mastectomy





[place] node biopsy


Anesthesia: ANNA


Surgeon: Joaquin Lopez


Pathology: other (Right breast)


Condition: stable


Disposition: PACU


Description of Procedure: 


The patient's placed on the operating table in the supine position she received 

IV sedation and then general anesthesia.  Her right breast was prepped and 

draped usual fashion.  The right breast was injected with methylene blue in 4 

quadrants.  The mastectomy flaps were marked and then the mastectomy flaps were 

incised with a 15 blade.  Left cautery was used to dissect the mastectomy flaps.

 The fascia created to the level of the clavicle superiorly and the sternum 

medially the inframammary crease inferiorly and the sella laterally.





In the nasal of the sentinel node was found using the neoprobe once the 

clavipectoral fascia was opened.  The sentinel node was sent to pathology.  

There was still evidence of metastatic disease in the sentinel node.





At this point the breast was then removed from the chest wall using 

electrocautery and the Harmonic scissors.  A DALJIT drain was placed in the 

mastectomy bed and brought through separate stab incision in the skin was closed

in 2 layers using 0 Vicryl suture and 3-0 Monocryl suture.  Dermabond dressing 

applied.  Patient was sent to recovery room stable condition.

## 2020-01-21 NOTE — CDI
Outpatient Documentation Clarification Form



Date:  1/21/20



CDS. Name:  Dee Negron

Phone: If any questions, call Ale Allen at [phone]



[place] Name.  Lexi Hudson M

Account Number [id number]

Admit Date:  1/10/20

Discharge Date:  1/12/20



ATTENTION:  The [facility] Coding Staff appreciate your assistance in clarifying 
documentation.  Please respond to the

clarification below the line at the bottom and electronically sign.  The [facility] 
Coding staff will review the response and

follow-up if needed.  Please Note: Queries are made part of the [facility] 
Record.  If you have any questions,

please contact the .



Dear Dr. Lopez,



Please provide clarification as to the location of the biopsied lymph nodes.  In
order to follow the national coding

guidelines, please clarify if the lymph nodes were deep or superficial.  This is
according to our CPT assistant

below: 



References for ecommendation #2 - [facility], October 21, 2008

Use the code 50305 only when the operative report documents that the surgeon 
entered the muscle and removed deep lymph nodes in addition to removing 
superficial lymph nodes.



CPT Assistant, September 2008:

To in ascertaining which lymph node excision code to report, the axillary lymph 
nodes are divided into Levels I through III. Levels II and III would always be 
deep (code 55963). Level I may be deep (code 84824) or superficial (code 97620),
depending on the patient's body habitus. Superficial nodes at most sites would 
be easily palpable. Since Level I axillary lymph nodes may be deep or 
superficial, it is important that the depth (ie, deep or superficial) be 
documented in the medical record to ensure correct coding.



Code 12695 or 80097 may involve removal of only one lymph node or a number of 
lymph nodes, as determined by sentinel lymph node identification by the 
physician during the dissection or by palpation.



Thank you for your kind consideration,

 
________________________________________________________________________________

_____________________________________________________

Deep axillary lymph node biopsy 

MTDD

## 2020-07-06 ENCOUNTER — HOSPITAL ENCOUNTER (EMERGENCY)
Dept: HOSPITAL 47 - EC | Age: 85
Discharge: HOME | End: 2020-07-06
Payer: MEDICARE

## 2020-07-06 VITALS — DIASTOLIC BLOOD PRESSURE: 78 MMHG | SYSTOLIC BLOOD PRESSURE: 141 MMHG

## 2020-07-06 VITALS — TEMPERATURE: 97.9 F | RESPIRATION RATE: 16 BRPM | HEART RATE: 74 BPM

## 2020-07-06 DIAGNOSIS — Z79.890: ICD-10-CM

## 2020-07-06 DIAGNOSIS — Z85.3: ICD-10-CM

## 2020-07-06 DIAGNOSIS — Z79.82: ICD-10-CM

## 2020-07-06 DIAGNOSIS — Z88.5: ICD-10-CM

## 2020-07-06 DIAGNOSIS — N39.0: ICD-10-CM

## 2020-07-06 DIAGNOSIS — R06.4: ICD-10-CM

## 2020-07-06 DIAGNOSIS — Z79.899: ICD-10-CM

## 2020-07-06 DIAGNOSIS — R55: Primary | ICD-10-CM

## 2020-07-06 DIAGNOSIS — Z80.0: ICD-10-CM

## 2020-07-06 DIAGNOSIS — E07.9: ICD-10-CM

## 2020-07-06 DIAGNOSIS — Z90.12: ICD-10-CM

## 2020-07-06 DIAGNOSIS — M19.90: ICD-10-CM

## 2020-07-06 LAB
ALBUMIN SERPL-MCNC: 4.1 G/DL (ref 3.5–5)
ALP SERPL-CCNC: 56 U/L (ref 38–126)
ALT SERPL-CCNC: 23 U/L (ref 4–34)
ANION GAP SERPL CALC-SCNC: 8 MMOL/L
APTT BLD: 22.5 SEC (ref 22–30)
AST SERPL-CCNC: 34 U/L (ref 14–36)
BASOPHILS # BLD AUTO: 0.1 K/UL (ref 0–0.2)
BASOPHILS NFR BLD AUTO: 1 %
BUN SERPL-SCNC: 33 MG/DL (ref 7–17)
CALCIUM SPEC-MCNC: 9.8 MG/DL (ref 8.4–10.2)
CHLORIDE SERPL-SCNC: 106 MMOL/L (ref 98–107)
CO2 SERPL-SCNC: 21 MMOL/L (ref 22–30)
EOSINOPHIL # BLD AUTO: 0.5 K/UL (ref 0–0.7)
EOSINOPHIL NFR BLD AUTO: 9 %
ERYTHROCYTE [DISTWIDTH] IN BLOOD BY AUTOMATED COUNT: 4.92 M/UL (ref 3.8–5.4)
ERYTHROCYTE [DISTWIDTH] IN BLOOD: 14.3 % (ref 11.5–15.5)
GLUCOSE SERPL-MCNC: 110 MG/DL (ref 74–99)
HCT VFR BLD AUTO: 42.7 % (ref 34–46)
HGB BLD-MCNC: 13.8 GM/DL (ref 11.4–16)
INR PPP: 1 (ref ?–1.2)
LYMPHOCYTES # SPEC AUTO: 2.2 K/UL (ref 1–4.8)
LYMPHOCYTES NFR SPEC AUTO: 36 %
MCH RBC QN AUTO: 28.1 PG (ref 25–35)
MCHC RBC AUTO-ENTMCNC: 32.4 G/DL (ref 31–37)
MCV RBC AUTO: 86.7 FL (ref 80–100)
MONOCYTES # BLD AUTO: 0.4 K/UL (ref 0–1)
MONOCYTES NFR BLD AUTO: 7 %
NEUTROPHILS # BLD AUTO: 2.7 K/UL (ref 1.3–7.7)
NEUTROPHILS NFR BLD AUTO: 43 %
PH UR: 6.5 [PH] (ref 5–8)
PLATELET # BLD AUTO: 191 K/UL (ref 150–450)
POTASSIUM SERPL-SCNC: 4.6 MMOL/L (ref 3.5–5.1)
PROT SERPL-MCNC: 7.2 G/DL (ref 6.3–8.2)
PT BLD: 9.9 SEC (ref 9–12)
RBC UR QL: 1 /HPF (ref 0–5)
SODIUM SERPL-SCNC: 135 MMOL/L (ref 137–145)
SP GR UR: 1.02 (ref 1–1.03)
SQUAMOUS UR QL AUTO: 1 /HPF (ref 0–4)
UROBILINOGEN UR QL STRIP: <2 MG/DL (ref ?–2)
WBC # BLD AUTO: 6.1 K/UL (ref 3.8–10.6)
WBC # UR AUTO: 6 /HPF (ref 0–5)

## 2020-07-06 PROCEDURE — 99285 EMERGENCY DEPT VISIT HI MDM: CPT

## 2020-07-06 PROCEDURE — 93005 ELECTROCARDIOGRAM TRACING: CPT

## 2020-07-06 PROCEDURE — 83605 ASSAY OF LACTIC ACID: CPT

## 2020-07-06 PROCEDURE — 81001 URINALYSIS AUTO W/SCOPE: CPT

## 2020-07-06 PROCEDURE — 71046 X-RAY EXAM CHEST 2 VIEWS: CPT

## 2020-07-06 PROCEDURE — 96374 THER/PROPH/DIAG INJ IV PUSH: CPT

## 2020-07-06 PROCEDURE — 36415 COLL VENOUS BLD VENIPUNCTURE: CPT

## 2020-07-06 PROCEDURE — 70450 CT HEAD/BRAIN W/O DYE: CPT

## 2020-07-06 PROCEDURE — 85610 PROTHROMBIN TIME: CPT

## 2020-07-06 PROCEDURE — 80053 COMPREHEN METABOLIC PANEL: CPT

## 2020-07-06 PROCEDURE — 84484 ASSAY OF TROPONIN QUANT: CPT

## 2020-07-06 PROCEDURE — 85730 THROMBOPLASTIN TIME PARTIAL: CPT

## 2020-07-06 PROCEDURE — 85025 COMPLETE CBC W/AUTO DIFF WBC: CPT

## 2020-07-06 NOTE — XR
EXAMINATION TYPE: XR chest 2V

 

DATE OF EXAM: 7/6/2020

 

COMPARISON: 9/9/2017

 

HISTORY: Shortness of breath

 

TECHNIQUE:  Frontal and lateral views of the chest are obtained.

 

FINDINGS:

 

Scattered senescent parenchymal changes noted. Hyperinflation compatible with COPD. 

 

No evidence for infiltrate. No evidence for atelectasis.

 

Heart size is stable.

 

Mediastinal structures are stable and grossly unremarkable.

 

No evidence for hilar prominence.

 

Degenerative changes dorsal spine. 

 

IMPRESSION:

1. No evidence for acute pulmonary disease.

## 2020-07-06 NOTE — CT
EXAMINATION TYPE: CT brain wo con

 

DATE OF EXAM: 7/6/2020

 

COMPARISON: 9/9/17

 

HISTORY: Hyperventilating and dizziness

 

CT DLP: 1099.4 mGycm

 

Unenhanced CT of the brain was performed.  

 

The ventricles, basal cisterns and sulci overlying the cerebral convexities demonstrate mild enlargem
ent. 

 

There is no evidence for intracranial hemorrhage or sulcal effacement.  

 

There is decreased attenuation about the periventricular white matter and deep white matter of both c
erebral hemispheres, compatible with chronic small vessel ischemia. Differential diagnosis does inclu
de demyelination. 

 

No mass effects are seen.No midline shift.

 

Osseous calvarium is intact.  

 

If symptoms persist consider MRI.  

 

IMPRESSION:

 

1. Age related atrophic and chronic small vessel ischemic change without acute intracranial process s
een at this time.

## 2020-07-06 NOTE — ED
General Adult HPI





- General


Source: patient, RN notes reviewed, old records reviewed


Mode of arrival: wheelchair


Limitations: no limitations





<Jose Butts - Last Filed: 07/06/20 15:00>





<Jose Soriano - Last Filed: 07/06/20 16:00>





- General


Chief complaint: Shortness of Breath


Stated complaint: hyperventilating


Time Seen by Provider: 07/06/20 13:05





- History of Present Illness


Initial comments: 





This is an 88-year-old female presents emergency Department with L sinus 

disease.   states that he brings her in today because she's been having 

episodes every 2 days of hyperventilation and then she gets very lightheaded 

takes about a half an hour for these episodes to resolve.  Patient does not have

any complaints of pain according to the  there has been no fever.  

 states she has not appeared to be having any difficulty breathing in 

between episodes.  There is been no history of abdominal pain is been no nausea 

vomiting.  Patient states she has no pain currently.   states all the 

symptoms started about 3 months prior to arrival to the emergency department.  

Patient currently is having no symptoms (Jose Butts)





- Related Data


                                Home Medications











 Medication  Instructions  Recorded  Confirmed


 


Aspirin EC [Ecotrin Low Dose] 81 mg PO DAILY 09/09/17 07/06/20


 


Cyanocobalamin [Vitamin B-12] 500 mcg PO HS 09/09/17 07/06/20


 


Multivitamins, Thera [Multivitamin 1 tab PO DAILY 09/09/17 07/06/20





(formulary)]   


 


Glucosam/Ney-Msm1/C/Ismael/Bosw 1 each PO DAILY 09/13/17 07/06/20





[Glucosamine-Chondroitin Tablet]   


 


Memantine [Namenda] 10 mg PO BID 01/08/20 07/06/20


 


Fish Oil/Dha/Epa [Fish Oil 1,200 1 cap PO BID 07/06/20 07/06/20





mg Fish Oil]   


 


Flaxseed Oil 1,000 mg PO BID 07/06/20 07/06/20


 


Ibuprofen 200 mg PO BID 07/06/20 07/06/20


 


Levothyroxine Sodium 88 mcg PO DAILY 07/06/20 07/06/20








                                  Previous Rx's











 Medication  Instructions  Recorded


 


Amoxic-Pot Clav 875-125Mg 1 tab PO Q12HR #10 tablet 07/06/20





[Augmentin 875-125]  











                                    Allergies











Allergy/AdvReac Type Severity Reaction Status Date / Time


 


meperidine [From Demerol] Allergy  Rash/Hives Verified 07/06/20 14:15














Review of Systems


ROS Other: All systems not noted in ROS Statement are negative.





<Jose Butts - Last Filed: 07/06/20 15:00>


ROS Other: All systems not noted in ROS Statement are negative.





<Jose Soriano - Last Filed: 07/06/20 16:00>


ROS Statement: 


Those systems with pertinent positive or pertinent negative responses have been 

documented in the HPI.








Past Medical History


Past Medical History: Cancer, Memory Impairment, Osteoarthritis (OA), Thyroid 

Disorder


Additional Past Medical History / Comment(s): breast cancer, chronic back pain 

and neck pain, dizzy spells


History of Any Multi-Drug Resistant Organisms: None Reported


Past Surgical History: Bladder Surgery, Breast Surgery, Hysterectomy


Additional Past Surgical History / Comment(s): left mastectomy, yara cataracts


Past Anesthesia/Blood Transfusion Reactions: Motion Sickness


Additional Past Anesthesia/Blood Transfusion Reaction / Comment(s): gets dizzy 

if she lays down flat


Past Psychological History: No Psychological Hx Reported


Smoking Status: Never smoker


Past Alcohol Use History: None Reported


Past Drug Use History: None Reported





- Past Family History


  ** Father


Family Medical History: Cancer


Additional Family Medical History / Comment(s): Lung





  ** Mother


Family Medical History: Cancer


Additional Family Medical History / Comment(s): Colon metastasize to liver





  ** Sister(s)


Family Medical History: Cancer


Additional Family Medical History / Comment(s): Breast





<Jose Butts - Last Filed: 07/06/20 15:00>





General Exam


Limitations: no limitations





<Jose Butts - Last Filed: 07/06/20 15:00>





- General Exam Comments


Initial Comments: 





GENERAL:


Patient is well-developed and well-nourished.  Patient is nontoxic and well-

hydrated and is in no acute distress.





ENT:


Neck is soft and supple.  No significant lymphadenopathy is noted.  Oropharynx 

is clear.  Moist mucous membranes.  Neck has full range of motion without 

eliciting any pain.  





EYES:


The sclera were anicteric and conjunctiva were pink and moist.  Extraocular 

movements were intact and pupils were equal round and reactive to light.  

Eyelids were unremarkable.





PULMONARY:


Unlabored respirations.  Good breath sounds bilaterally.  No audible rales 

rhonchi or wheezing was noted.





CARDIOVASCULAR: 


Regular rate and rhythm no murmurs are heard





ABDOMEN:


Soft and nontender with normal bowel sounds.  No palpable organomegaly was 

noted.  There is no palpable pulsatile mass.





SKIN:


Skin is clear with no lesions or rashes and otherwise unremarkable.





NEUROLOGIC:


Patient is alert and oriented 2.  Cranial nerves II through XII are grossly 

intact.  Motor and sensory are also intact.  Normal speech, volume and content. 

Symmetrical smile. 





MUSCULOSKELETAL:


Normal extremities with adequate strength and full range of motion.  No lower 

extremity swelling or edema.  No calf tenderness.





LYMPHATICS:


No significant lymphadenopathy is noted





PSYCHIATRIC:


Normal psychiatric evaluation.   (oJse Butts)





Course





                                   Vital Signs











  07/06/20 07/06/20





  13:07 13:30


 


Temperature 97.9 F 


 


Pulse Rate 74 


 


Respiratory 16 16





Rate  


 


Blood Pressure 136/70 


 


O2 Sat by Pulse 94 L 





Oximetry  














Medical Decision Making





- Lab Data


Result diagrams: 


                                 07/06/20 14:24





                                 07/06/20 14:24





<Jose Butts - Last Filed: 07/06/20 15:00>





- Lab Data


Result diagrams: 


                                 07/06/20 14:24





                                 07/06/20 14:24





- Radiology Data


Radiology results: report reviewed (CT brain and chest x-ray are negative for 

significant acute disease), image reviewed





<Jose Soriano - Last Filed: 07/06/20 16:00>





- Medical Decision Making





EKG shows sinus rhythm with occasional PAC at 60 bpm VT interval 164 QRS is 142 

QT interval 450 QTC is 478.  Patient's EKG shows a right bundle branch block.





Dr. Soriano will be taking over the care of this patient at 3 PM 

(Jose Butts)


88 female DF for evaluation of occasional syncopal events.  Hyperventilation 

proceeds these events, no recurrent symptoms here in the ER lab values are 

normal CT scan just showing negative, UTI on urinalysis, patient can be discha

rged home (Jose Soriano)





- Lab Data





                                   Lab Results











  07/06/20 07/06/20 07/06/20 Range/Units





  14:08 14:24 14:24 


 


WBC   6.1   (3.8-10.6)  k/uL


 


RBC   4.92   (3.80-5.40)  m/uL


 


Hgb   13.8   (11.4-16.0)  gm/dL


 


Hct   42.7   (34.0-46.0)  %


 


MCV   86.7   (80.0-100.0)  fL


 


MCH   28.1   (25.0-35.0)  pg


 


MCHC   32.4   (31.0-37.0)  g/dL


 


RDW   14.3   (11.5-15.5)  %


 


Plt Count   191   (150-450)  k/uL


 


Neutrophils %   43   %


 


Lymphocytes %   36   %


 


Monocytes %   7   %


 


Eosinophils %   9   %


 


Basophils %   1   %


 


Neutrophils #   2.7   (1.3-7.7)  k/uL


 


Lymphocytes #   2.2   (1.0-4.8)  k/uL


 


Monocytes #   0.4   (0-1.0)  k/uL


 


Eosinophils #   0.5   (0-0.7)  k/uL


 


Basophils #   0.1   (0-0.2)  k/uL


 


PT    9.9  (9.0-12.0)  sec


 


INR    1.0  (<1.2)  


 


APTT    22.5  (22.0-30.0)  sec


 


Sodium     (137-145)  mmol/L


 


Potassium     (3.5-5.1)  mmol/L


 


Chloride     ()  mmol/L


 


Carbon Dioxide     (22-30)  mmol/L


 


Anion Gap     mmol/L


 


BUN     (7-17)  mg/dL


 


Creatinine     (0.52-1.04)  mg/dL


 


Est GFR (CKD-EPI)AfAm     (>60 ml/min/1.73 sqM)  


 


Est GFR (CKD-EPI)NonAf     (>60 ml/min/1.73 sqM)  


 


Glucose     (74-99)  mg/dL


 


Plasma Lactic Acid Slick     (0.7-2.0)  mmol/L


 


Calcium     (8.4-10.2)  mg/dL


 


Total Bilirubin     (0.2-1.3)  mg/dL


 


AST     (14-36)  U/L


 


ALT     (4-34)  U/L


 


Alkaline Phosphatase     ()  U/L


 


Troponin I     (0.000-0.034)  ng/mL


 


Total Protein     (6.3-8.2)  g/dL


 


Albumin     (3.5-5.0)  g/dL


 


Urine Color  Yellow    


 


Urine Appearance  Cloudy H    (Clear)  


 


Urine pH  6.5    (5.0-8.0)  


 


Ur Specific Gravity  1.017    (1.001-1.035)  


 


Urine Protein  Negative    (Negative)  


 


Urine Glucose (UA)  Negative    (Negative)  


 


Urine Ketones  Negative    (Negative)  


 


Urine Blood  Negative    (Negative)  


 


Urine Nitrite  Positive H    (Negative)  


 


Urine Bilirubin  Negative    (Negative)  


 


Urine Urobilinogen  <2.0    (<2.0)  mg/dL


 


Ur Leukocyte Esterase  Small H    (Negative)  


 


Urine RBC  1    (0-5)  /hpf


 


Urine WBC  6 H    (0-5)  /hpf


 


Ur Squamous Epith Cells  1    (0-4)  /hpf


 


Urine Bacteria  Moderate H    (None)  /hpf


 


Urine Mucus  Occasional H    (None)  /hpf














  07/06/20 07/06/20 07/06/20 Range/Units





  14:24 14:24 14:24 


 


WBC     (3.8-10.6)  k/uL


 


RBC     (3.80-5.40)  m/uL


 


Hgb     (11.4-16.0)  gm/dL


 


Hct     (34.0-46.0)  %


 


MCV     (80.0-100.0)  fL


 


MCH     (25.0-35.0)  pg


 


MCHC     (31.0-37.0)  g/dL


 


RDW     (11.5-15.5)  %


 


Plt Count     (150-450)  k/uL


 


Neutrophils %     %


 


Lymphocytes %     %


 


Monocytes %     %


 


Eosinophils %     %


 


Basophils %     %


 


Neutrophils #     (1.3-7.7)  k/uL


 


Lymphocytes #     (1.0-4.8)  k/uL


 


Monocytes #     (0-1.0)  k/uL


 


Eosinophils #     (0-0.7)  k/uL


 


Basophils #     (0-0.2)  k/uL


 


PT     (9.0-12.0)  sec


 


INR     (<1.2)  


 


APTT     (22.0-30.0)  sec


 


Sodium  135 L    (137-145)  mmol/L


 


Potassium  4.6    (3.5-5.1)  mmol/L


 


Chloride  106    ()  mmol/L


 


Carbon Dioxide  21 L    (22-30)  mmol/L


 


Anion Gap  8    mmol/L


 


BUN  33 H    (7-17)  mg/dL


 


Creatinine  1.46 H    (0.52-1.04)  mg/dL


 


Est GFR (CKD-EPI)AfAm  37    (>60 ml/min/1.73 sqM)  


 


Est GFR (CKD-EPI)NonAf  32    (>60 ml/min/1.73 sqM)  


 


Glucose  110 H    (74-99)  mg/dL


 


Plasma Lactic Acid Slick   1.2   (0.7-2.0)  mmol/L


 


Calcium  9.8    (8.4-10.2)  mg/dL


 


Total Bilirubin  0.6    (0.2-1.3)  mg/dL


 


AST  34    (14-36)  U/L


 


ALT  23    (4-34)  U/L


 


Alkaline Phosphatase  56    ()  U/L


 


Troponin I    <0.012  (0.000-0.034)  ng/mL


 


Total Protein  7.2    (6.3-8.2)  g/dL


 


Albumin  4.1    (3.5-5.0)  g/dL


 


Urine Color     


 


Urine Appearance     (Clear)  


 


Urine pH     (5.0-8.0)  


 


Ur Specific Gravity     (1.001-1.035)  


 


Urine Protein     (Negative)  


 


Urine Glucose (UA)     (Negative)  


 


Urine Ketones     (Negative)  


 


Urine Blood     (Negative)  


 


Urine Nitrite     (Negative)  


 


Urine Bilirubin     (Negative)  


 


Urine Urobilinogen     (<2.0)  mg/dL


 


Ur Leukocyte Esterase     (Negative)  


 


Urine RBC     (0-5)  /hpf


 


Urine WBC     (0-5)  /hpf


 


Ur Squamous Epith Cells     (0-4)  /hpf


 


Urine Bacteria     (None)  /hpf


 


Urine Mucus     (None)  /hpf














Disposition





<Jose Butts - Last Filed: 07/06/20 15:00>


Is patient prescribed a controlled substance at d/c from ED?: No





<Jose Soriano - Last Filed: 07/06/20 16:00>


Clinical Impression: 


 UTI (urinary tract infection), Syncope





Disposition: HOME SELF-CARE


Condition: Good


Instructions (If sedation given, give patient instructions):  Urinary Tract 

Infection in Women (ED)


Prescriptions: 


Amoxic-Pot Clav 875-125Mg [Augmentin 875-125] 1 tab PO Q12HR #10 tablet


Referrals: 


Jarek Carter MD [Primary Care Provider] - 1-2 days

## 2020-07-16 ENCOUNTER — HOSPITAL ENCOUNTER (EMERGENCY)
Dept: HOSPITAL 47 - EC | Age: 85
Discharge: HOME | End: 2020-07-16
Payer: MEDICARE

## 2020-07-16 VITALS — HEART RATE: 72 BPM | SYSTOLIC BLOOD PRESSURE: 132 MMHG | DIASTOLIC BLOOD PRESSURE: 76 MMHG

## 2020-07-16 VITALS — TEMPERATURE: 97.8 F | RESPIRATION RATE: 18 BRPM

## 2020-07-16 DIAGNOSIS — Z85.3: ICD-10-CM

## 2020-07-16 DIAGNOSIS — Z79.1: ICD-10-CM
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
## 2020-07-16 DIAGNOSIS — E07.9: ICD-10-CM

## 2020-07-16 DIAGNOSIS — M19.90: ICD-10-CM

## 2020-07-16 DIAGNOSIS — Z98.42: ICD-10-CM
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
## 2020-07-16 DIAGNOSIS — Z80.8: ICD-10-CM

## 2020-07-16 DIAGNOSIS — Z98.41: ICD-10-CM

## 2020-07-16 DIAGNOSIS — Z80.3: ICD-10-CM

## 2020-07-16 DIAGNOSIS — Z79.82: ICD-10-CM
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
## 2020-07-16 DIAGNOSIS — Z80.1: ICD-10-CM

## 2020-07-16 DIAGNOSIS — Z79.890: ICD-10-CM

## 2020-07-16 DIAGNOSIS — S01.01XA: Primary | ICD-10-CM

## 2020-07-16 DIAGNOSIS — Z88.6: ICD-10-CM

## 2020-07-16 DIAGNOSIS — Z90.12: ICD-10-CM

## 2020-07-16 PROCEDURE — 70450 CT HEAD/BRAIN W/O DYE: CPT

## 2020-07-16 PROCEDURE — 72125 CT NECK SPINE W/O DYE: CPT

## 2020-07-16 PROCEDURE — 99284 EMERGENCY DEPT VISIT MOD MDM: CPT

## 2020-07-16 PROCEDURE — 12001 RPR S/N/AX/GEN/TRNK 2.5CM/<: CPT

## 2020-07-16 NOTE — CT
EXAMINATION TYPE: CT brain [place] wo con

 

DATE OF EXAM: 7/16/2020

 

COMPARISON: Brain 7/6/2020

 

HISTORY: 88 year-old female possible syncopal episode with fall and blow to back of head. Fall, confu
tj.

 

CT DLP: 1290.3 mGycm

Automated exposure control for dose reduction was used.

 

Technique:  Examination of the head was done in axial plane without intravenous contrast. Coronal and
 sagittal reconstructions performed.

 

CT of the cervical spine was obtained in axial plane without intravenous injection of  contrast mater
ial.  Coronal and sagittal reformatted images were obtained from the axial views for evaluation of  f
ractures, spinal alignment and canal.

 

 

FINDINGS:

 

Head:

There is no evidence of  acute intracranial hemorrhage, acute ischemic changes, mass, mass-effect, or
 extra-axial fluid collection.  There is no effacement of cerebral sulci or basal subarachnoid cister
ns. There is no midline shift.  Gray-white matter distinction is preserved.

 

Moderate bifrontal atrophy redemonstrated. Mild ventricular prominence secondary to central cerebral 
atrophy is unchanged.

 

Small to moderate left posterior scalp contusion without underlying calvarial fracture.

 

Stable 7 mm probable osteoma from the right frontal outer table of the calvarium. Paranasal sinuses a
nd mastoid air cells well pneumatized. Visualized orbits and globes show no gross abnormality. Cerume
n in left external auditory canal.

 

 

Cervical spine:

No cranial cervical junction abnormality, predental space widening, or prevertebral soft tissue swell
ing. Degenerative changes at the C1 dens articulation.

 

Straightening of the normal cervical lordosis.

 

Trace grade 1 anterolisthesis at C3-C4, C4-C5, and C7-T1.

 

Moderate to advanced degenerative disc disease particularly at C6-C7. Scattered facet and uncovertebr
al joint degenerative change.

 

No acute fracture of the cervical spine.

 

Artifact from the patient's shoulders limits assessment of the spinal canal from C5-C6 and below.

 

At C3-C4, moderate left neural foraminal stenosis.

At C4-C5, mild to moderate left neural foraminal stenosis.

At C5-C6, mild bilateral neural foraminal stenosis.

 

There seems to be some flattening of the trachea, refer to axial image 93. This may reflect tracheoma
lacia.

 

Sagittal and coronal reformatted images confirm above findings.

 

 

COMBINED IMPRESSION:

1. Mild to moderate posterior scalp contusion. Stable moderate bifrontal atrophy and some central cer
ebral atrophy resulting in mild ventricular prominence. No acute intracranial abnormality seen.

2. No acute fracture of the cervical spine. Moderate spondylotic change. Degenerative grade 1 anterol
isthesis at C3-C4, C4-C5, and C7-T1.

3. 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 Clinically
 correlate.

## 2020-07-16 NOTE — ED
Fall HPI





- General


Chief Complaint: Fall


Stated Complaint: fall/head lac


Time Seen by Provider: 07/16/20 09:43


Source: patient, family, RN notes reviewed, old records reviewed


Mode of arrival: wheelchair





- History of Present Illness


Initial Comments: 





Patient is a 88-year-old female with a history of dementia who presents 

emergency department today after tripping and falling in the bathroom and 

hitting her head on the east time of floor.  Patient is a poor historian but her

 states that there is no significant loss of consciousness.  He got the 

Patient up and was resting in bed.  Shortly afterward he noticed the Patient had

evidence of bleeding and a laceration to the back of the scalp.  Patient's 

 reports that she has been at baseline and denies any changes in her 

mental status.  Patient reports no neck pain or any other pain related to the 

fall.





- Related Data


                                Home Medications











 Medication  Instructions  Recorded  Confirmed


 


Aspirin EC [Ecotrin Low Dose] 81 mg PO DAILY 09/09/17 07/06/20


 


Cyanocobalamin [Vitamin B-12] 500 mcg PO HS 09/09/17 07/06/20


 


Multivitamins, Thera [Multivitamin 1 tab PO DAILY 09/09/17 07/06/20





(formulary)]   


 


Glucosam/Ney-Msm1/C/Ismael/Bosw 1 each PO DAILY 09/13/17 07/06/20





[Glucosamine-Chondroitin Tablet]   


 


Memantine [Namenda] 10 mg PO BID 01/08/20 07/06/20


 


Fish Oil/Dha/Epa [Fish Oil 1,200 1 cap PO BID 07/06/20 07/06/20





mg Fish Oil]   


 


Flaxseed Oil 1,000 mg PO BID 07/06/20 07/06/20


 


Ibuprofen 200 mg PO BID 07/06/20 07/06/20


 


Levothyroxine Sodium 88 mcg PO DAILY 07/06/20 07/06/20








                                  Previous Rx's











 Medication  Instructions  Recorded


 


Amoxic-Pot Clav 875-125Mg 1 tab PO Q12HR #10 tablet 07/06/20





[Augmentin 875-125]  











                                    Allergies











Allergy/AdvReac Type Severity Reaction Status Date / Time


 


meperidine [From Demerol] Allergy  Rash/Hives Verified 07/16/20 09:35














Review of Systems


ROS Statement: 


Those systems with pertinent positive or pertinent negative responses have been 

documented in the HPI.





ROS Other: All systems not noted in ROS Statement are negative.





Past Medical History


Past Medical History: Cancer, Memory Impairment, Osteoarthritis (OA), Thyroid 

Disorder


Additional Past Medical History / Comment(s): breast cancer, chronic back pain 

and neck pain, dizzy spells


History of Any Multi-Drug Resistant Organisms: None Reported


Past Surgical History: Bladder Surgery, Breast Surgery, Hysterectomy


Additional Past Surgical History / Comment(s): left mastectomy, yara cataracts


Past Anesthesia/Blood Transfusion Reactions: Motion Sickness


Additional Past Anesthesia/Blood Transfusion Reaction / Comment(s): gets dizzy 

if she lays down flat


Past Psychological History: No Psychological Hx Reported


Smoking Status: Never smoker


Past Alcohol Use History: None Reported


Past Drug Use History: None Reported





- Past Family History


  ** Father


Family Medical History: Cancer


Additional Family Medical History / Comment(s): Lung





  ** Mother


Family Medical History: Cancer


Additional Family Medical History / Comment(s): Colon metastasize to liver





  ** Sister(s)


Family Medical History: Cancer


Additional Family Medical History / Comment(s): Breast





General Exam





- General Exam Comments


Initial Comments: 





Patient is an 88-year-old female.  History of dementia.  At baseline.  Alert and

oriented 2


Limitations: no limitations


General appearance: alert, in no apparent distress


Head exam: Present: atraumatic, normocephalic, normal inspection, other (Patient

has a contusion over the dysuria scalp.  Evidence of linear laceration .)


Eye exam: Present: normal appearance, PERRL, EOMI.  Absent: scleral icterus, 

conjunctival injection, periorbital swelling


ENT exam: Present: normal exam, mucous membranes moist


Neck exam: Present: normal inspection.  Absent: tenderness, meningismus, 

lymphadenopathy


Respiratory exam: Present: normal lung sounds bilaterally.  Absent: respiratory 

distress, wheezes, rales, rhonchi, stridor


Cardiovascular Exam: Present: regular rate, normal rhythm, normal heart sounds. 

Absent: systolic murmur, diastolic murmur, rubs, gallop, clicks


GI/Abdominal exam: Present: soft, normal bowel sounds.  Absent: distended, 

tenderness, guarding, rebound, rigid


Extremities exam: Present: normal inspection, full ROM, normal capillary refill.

 Absent: tenderness, pedal edema, joint swelling, calf tenderness


Back exam: Present: normal inspection


Neurological exam: Present: alert, oriented X3, CN II-XII intact


Psychiatric exam: Present: normal affect, normal mood


Skin exam: Present: warm, dry, intact, normal color.  Absent: rash





Course


                                   Vital Signs











  07/16/20 07/16/20





  09:33 11:29


 


Temperature 97.8 F 


 


Pulse Rate 66 72


 


Respiratory 18 18





Rate  


 


Blood Pressure 121/80 132/76


 


O2 Sat by Pulse 98 98





Oximetry  














Procedures





- Laceration


  ** Laceration #1


Site: scalp


Size (cm): 2


Description: linear


Depth: simple, single layer


Pre-repair: wound explored, irrigated extensively


Type of Sutures: other (staples)


Number of Sutures: 3


Patient Tolerated Procedure: well, no complications





Medical Decision Making





- Medical Decision Making





80-year-old female presents restarted after trip and fall the bathroom hitting 

the back of her head on the floor.  Patient has history of dementia and is at 

baseline according to .  She denies any other complaints of pain besides 

a headache.  Patient has a posterior scalp hematoma with laceration.  Laceration

was closed with 3 staples.  CT of the brain and C-spine reviewed and negative 

for acute fracture.  No signs of intracranial bleeding.  There is evidence of 

degenerative changes in the cervical spine.  I discussed the following with the 

Patient and patient's .  Discussed monitoring for infection of the 

staples.  Discussed the Patient to take Tylenol for pain and to be monitored and

there is any signs of mental status change return probably to the ER.  Patient's

family and Patient understand treatment plan will comply.





- Radiology Data


Radiology results: report reviewed


CT of the wrist is mild to moderate posterior scalp contusion.  Stable moderate 

bifrontal atrophy and some cerebral atrophy resulting in mild ventricular 

prominence.  No acute intracranial hemorrhage is seen.  C-spine shows no acute 

fracture of the cervical spine.  Moderate spondylitic change.





Grade 1 anterolisthesis of C3-C4 C4-C5 and C7 T1.  There are seems to be 

flattening of the trachea suggesting tracheomalacia.  Clinically correlate.





Disposition


Clinical Impression: 


 Fall, Head contusion





Disposition: HOME SELF-CARE


Condition: Good


Instructions (If sedation given, give patient instructions):  Head Injury (ED), 

Staple Care (ED)


Additional Instructions: 


Please use medication as discussed.  Patient should keep the staples in place 

and return in 10 days to have them removed.  Soap and water over the area.  Take

Tylenol and Motrin for headache and pain.  Please follow up with family doctor 

if symptoms have not improved over the next two days. Please return to the 

emergency room if your symptoms increase or worsen or for any other concerns.


Is patient prescribed a controlled substance at d/c from ED?: No


Referrals: 


Jarek Carter MD [Primary Care Provider] - 1-2 days


Time of Disposition: 11:08